# Patient Record
Sex: FEMALE | Race: WHITE | NOT HISPANIC OR LATINO
[De-identification: names, ages, dates, MRNs, and addresses within clinical notes are randomized per-mention and may not be internally consistent; named-entity substitution may affect disease eponyms.]

---

## 2017-03-01 ENCOUNTER — APPOINTMENT (OUTPATIENT)
Dept: THORACIC SURGERY | Facility: CLINIC | Age: 72
End: 2017-03-01

## 2017-03-01 VITALS
DIASTOLIC BLOOD PRESSURE: 80 MMHG | HEIGHT: 60 IN | BODY MASS INDEX: 26.11 KG/M2 | SYSTOLIC BLOOD PRESSURE: 150 MMHG | OXYGEN SATURATION: 98 % | HEART RATE: 91 BPM | WEIGHT: 133 LBS

## 2017-03-01 DIAGNOSIS — Z87.891 PERSONAL HISTORY OF NICOTINE DEPENDENCE: ICD-10-CM

## 2017-03-01 DIAGNOSIS — Z82.49 FAMILY HISTORY OF ISCHEMIC HEART DISEASE AND OTHER DISEASES OF THE CIRCULATORY SYSTEM: ICD-10-CM

## 2017-03-01 DIAGNOSIS — Z78.9 OTHER SPECIFIED HEALTH STATUS: ICD-10-CM

## 2017-07-26 ENCOUNTER — APPOINTMENT (OUTPATIENT)
Dept: THORACIC SURGERY | Facility: CLINIC | Age: 72
End: 2017-07-26

## 2017-07-26 VITALS
RESPIRATION RATE: 16 BRPM | HEART RATE: 95 BPM | DIASTOLIC BLOOD PRESSURE: 68 MMHG | SYSTOLIC BLOOD PRESSURE: 130 MMHG | HEIGHT: 60 IN | OXYGEN SATURATION: 95 %

## 2017-09-08 ENCOUNTER — FORM ENCOUNTER (OUTPATIENT)
Age: 72
End: 2017-09-08

## 2017-09-09 ENCOUNTER — APPOINTMENT (OUTPATIENT)
Dept: NUCLEAR MEDICINE | Facility: CLINIC | Age: 72
End: 2017-09-09
Payer: MEDICARE

## 2017-09-09 ENCOUNTER — OUTPATIENT (OUTPATIENT)
Dept: OUTPATIENT SERVICES | Facility: HOSPITAL | Age: 72
LOS: 1 days | End: 2017-09-09
Payer: MEDICARE

## 2017-09-09 DIAGNOSIS — R91.8 OTHER NONSPECIFIC ABNORMAL FINDING OF LUNG FIELD: ICD-10-CM

## 2017-09-09 PROCEDURE — 78815 PET IMAGE W/CT SKULL-THIGH: CPT | Mod: 26,PI

## 2017-09-09 PROCEDURE — A9552: CPT

## 2017-09-09 PROCEDURE — 78815 PET IMAGE W/CT SKULL-THIGH: CPT

## 2017-10-05 ENCOUNTER — APPOINTMENT (OUTPATIENT)
Dept: THORACIC SURGERY | Facility: CLINIC | Age: 72
End: 2017-10-05
Payer: MEDICARE

## 2017-10-05 ENCOUNTER — APPOINTMENT (OUTPATIENT)
Dept: THORACIC SURGERY | Facility: CLINIC | Age: 72
End: 2017-10-05

## 2017-10-05 VITALS
OXYGEN SATURATION: 96 % | HEART RATE: 82 BPM | WEIGHT: 135 LBS | TEMPERATURE: 97.8 F | RESPIRATION RATE: 15 BRPM | HEIGHT: 60 IN | SYSTOLIC BLOOD PRESSURE: 124 MMHG | BODY MASS INDEX: 26.5 KG/M2 | DIASTOLIC BLOOD PRESSURE: 75 MMHG

## 2017-10-05 DIAGNOSIS — Z01.810 ENCOUNTER FOR PREPROCEDURAL CARDIOVASCULAR EXAMINATION: ICD-10-CM

## 2017-10-05 PROCEDURE — 99215 OFFICE O/P EST HI 40 MIN: CPT

## 2017-10-05 RX ORDER — ESCITALOPRAM OXALATE 10 MG/1
10 TABLET ORAL
Qty: 90 | Refills: 0 | Status: DISCONTINUED | COMMUNITY
Start: 2016-11-02

## 2017-10-05 RX ORDER — RALOXIFENE HYDROCHLORIDE 60 MG/1
60 TABLET, FILM COATED ORAL
Qty: 90 | Refills: 0 | Status: DISCONTINUED | COMMUNITY
Start: 2017-06-12

## 2017-10-05 RX ORDER — CIPROFLOXACIN HYDROCHLORIDE 250 MG/1
250 TABLET, FILM COATED ORAL
Qty: 6 | Refills: 0 | Status: DISCONTINUED | COMMUNITY
Start: 2017-04-19

## 2017-12-11 ENCOUNTER — APPOINTMENT (OUTPATIENT)
Dept: PULMONOLOGY | Facility: CLINIC | Age: 72
End: 2017-12-11
Payer: MEDICARE

## 2017-12-11 PROCEDURE — 94060 EVALUATION OF WHEEZING: CPT

## 2017-12-11 PROCEDURE — 94726 PLETHYSMOGRAPHY LUNG VOLUMES: CPT

## 2017-12-11 PROCEDURE — 94729 DIFFUSING CAPACITY: CPT

## 2017-12-20 ENCOUNTER — APPOINTMENT (OUTPATIENT)
Dept: THORACIC SURGERY | Facility: CLINIC | Age: 72
End: 2017-12-20
Payer: MEDICARE

## 2017-12-20 VITALS
OXYGEN SATURATION: 97 % | DIASTOLIC BLOOD PRESSURE: 60 MMHG | HEIGHT: 60 IN | HEART RATE: 79 BPM | SYSTOLIC BLOOD PRESSURE: 128 MMHG

## 2017-12-20 PROCEDURE — 99214 OFFICE O/P EST MOD 30 MIN: CPT

## 2018-01-24 ENCOUNTER — FORM ENCOUNTER (OUTPATIENT)
Age: 73
End: 2018-01-24

## 2018-01-25 ENCOUNTER — OUTPATIENT (OUTPATIENT)
Dept: OUTPATIENT SERVICES | Facility: HOSPITAL | Age: 73
LOS: 1 days | End: 2018-01-25
Payer: MEDICARE

## 2018-01-25 ENCOUNTER — APPOINTMENT (OUTPATIENT)
Dept: CT IMAGING | Facility: CLINIC | Age: 73
End: 2018-01-25

## 2018-01-25 DIAGNOSIS — Z00.8 ENCOUNTER FOR OTHER GENERAL EXAMINATION: ICD-10-CM

## 2018-01-25 PROCEDURE — 71250 CT THORAX DX C-: CPT | Mod: 26

## 2018-01-25 PROCEDURE — 71250 CT THORAX DX C-: CPT

## 2018-01-31 ENCOUNTER — APPOINTMENT (OUTPATIENT)
Dept: THORACIC SURGERY | Facility: CLINIC | Age: 73
End: 2018-01-31
Payer: MEDICARE

## 2018-01-31 VITALS
BODY MASS INDEX: 25.98 KG/M2 | WEIGHT: 133 LBS | OXYGEN SATURATION: 97 % | DIASTOLIC BLOOD PRESSURE: 62 MMHG | SYSTOLIC BLOOD PRESSURE: 122 MMHG | HEART RATE: 74 BPM

## 2018-01-31 DIAGNOSIS — R91.8 OTHER NONSPECIFIC ABNORMAL FINDING OF LUNG FIELD: ICD-10-CM

## 2018-01-31 PROCEDURE — 99213 OFFICE O/P EST LOW 20 MIN: CPT

## 2018-07-18 ENCOUNTER — FORM ENCOUNTER (OUTPATIENT)
Age: 73
End: 2018-07-18

## 2018-07-19 ENCOUNTER — APPOINTMENT (OUTPATIENT)
Dept: CT IMAGING | Facility: CLINIC | Age: 73
End: 2018-07-19
Payer: MEDICARE

## 2018-07-19 ENCOUNTER — OUTPATIENT (OUTPATIENT)
Dept: OUTPATIENT SERVICES | Facility: HOSPITAL | Age: 73
LOS: 1 days | End: 2018-07-19
Payer: MEDICARE

## 2018-07-19 DIAGNOSIS — Z00.8 ENCOUNTER FOR OTHER GENERAL EXAMINATION: ICD-10-CM

## 2018-07-19 PROCEDURE — 71250 CT THORAX DX C-: CPT

## 2018-07-19 PROCEDURE — 71250 CT THORAX DX C-: CPT | Mod: 26

## 2018-07-25 ENCOUNTER — APPOINTMENT (OUTPATIENT)
Dept: THORACIC SURGERY | Facility: CLINIC | Age: 73
End: 2018-07-25
Payer: MEDICARE

## 2018-07-25 VITALS
BODY MASS INDEX: 26.11 KG/M2 | HEART RATE: 78 BPM | HEIGHT: 60 IN | RESPIRATION RATE: 16 BRPM | WEIGHT: 133 LBS | OXYGEN SATURATION: 95 % | TEMPERATURE: 98.2 F | DIASTOLIC BLOOD PRESSURE: 75 MMHG | SYSTOLIC BLOOD PRESSURE: 121 MMHG

## 2018-07-25 DIAGNOSIS — Z82.49 FAMILY HISTORY OF ISCHEMIC HEART DISEASE AND OTHER DISEASES OF THE CIRCULATORY SYSTEM: ICD-10-CM

## 2018-07-25 PROCEDURE — 99213 OFFICE O/P EST LOW 20 MIN: CPT

## 2018-12-06 ENCOUNTER — RECORD ABSTRACTING (OUTPATIENT)
Age: 73
End: 2018-12-06

## 2018-12-06 DIAGNOSIS — H81.399 OTHER PERIPHERAL VERTIGO, UNSPECIFIED EAR: ICD-10-CM

## 2018-12-06 DIAGNOSIS — I25.10 ATHEROSCLEROTIC HEART DISEASE OF NATIVE CORONARY ARTERY W/OUT ANGINA PECTORIS: ICD-10-CM

## 2018-12-06 DIAGNOSIS — Z87.74 PERSONAL HISTORY OF (CORRECTED) CONGENITAL MALFORMATIONS OF HEART AND CIRCULATORY SYSTEM: ICD-10-CM

## 2018-12-06 DIAGNOSIS — S32.000A WEDGE COMPRESSION FRACTURE OF UNSPECIFIED LUMBAR VERTEBRA, INITIAL ENCOUNTER FOR CLOSED FRACTURE: ICD-10-CM

## 2018-12-06 DIAGNOSIS — Z87.898 PERSONAL HISTORY OF OTHER SPECIFIED CONDITIONS: ICD-10-CM

## 2018-12-06 DIAGNOSIS — Z86.59 PERSONAL HISTORY OF OTHER MENTAL AND BEHAVIORAL DISORDERS: ICD-10-CM

## 2018-12-14 ENCOUNTER — APPOINTMENT (OUTPATIENT)
Dept: INTERNAL MEDICINE | Facility: CLINIC | Age: 73
End: 2018-12-14
Payer: MEDICARE

## 2018-12-14 ENCOUNTER — NON-APPOINTMENT (OUTPATIENT)
Age: 73
End: 2018-12-14

## 2018-12-14 VITALS
WEIGHT: 131 LBS | DIASTOLIC BLOOD PRESSURE: 79 MMHG | SYSTOLIC BLOOD PRESSURE: 136 MMHG | HEIGHT: 60 IN | HEART RATE: 85 BPM | BODY MASS INDEX: 25.72 KG/M2

## 2018-12-14 DIAGNOSIS — R73.03 PREDIABETES.: ICD-10-CM

## 2018-12-14 LAB
BILIRUB UR QL STRIP: NORMAL
CLARITY UR: CLEAR
COLLECTION METHOD: NORMAL
GLUCOSE UR-MCNC: NORMAL
HCG UR QL: 0.2 EU/DL
HGB UR QL STRIP.AUTO: NORMAL
KETONES UR-MCNC: NORMAL
LEUKOCYTE ESTERASE UR QL STRIP: NORMAL
NITRITE UR QL STRIP: NORMAL
PH UR STRIP: 6
PROT UR STRIP-MCNC: NORMAL
SP GR UR STRIP: 1.02

## 2018-12-14 PROCEDURE — 81003 URINALYSIS AUTO W/O SCOPE: CPT | Mod: QW

## 2018-12-14 PROCEDURE — 36415 COLL VENOUS BLD VENIPUNCTURE: CPT

## 2018-12-14 PROCEDURE — G0008: CPT

## 2018-12-14 PROCEDURE — 93000 ELECTROCARDIOGRAM COMPLETE: CPT

## 2018-12-14 PROCEDURE — G0439: CPT

## 2018-12-14 PROCEDURE — 90662 IIV NO PRSV INCREASED AG IM: CPT

## 2018-12-14 RX ORDER — NAPROXEN SODIUM 220 MG
TABLET ORAL
Refills: 0 | Status: DISCONTINUED | COMMUNITY
End: 2018-12-14

## 2018-12-14 NOTE — HISTORY OF PRESENT ILLNESS
[FreeTextEntry1] : for cpe  has joint pains and some stable ramos with stairs  her chest ct is stable and plan by dr godinez is a f/u ct in july 2019

## 2018-12-14 NOTE — COUNSELING
[Healthy eating counseling provided] : healthy eating [Activity counseling provided] : activity [None] : None [de-identified] : patient counseled on    diet   importance of exercise and not smoking  regular dental care and periodic eye exams.  timing of when will be due for colonoscopy   discussed  regular  mammography and  regular gyn visits discussed\par

## 2018-12-14 NOTE — PHYSICAL EXAM
[Normal] : normal gait, coordination grossly intact, no focal deficits [de-identified] : refueses breast  exam  she says she will see gyn

## 2018-12-14 NOTE — COUNSELING
[Healthy eating counseling provided] : healthy eating [Activity counseling provided] : activity [None] : None [de-identified] : patient counseled on    diet   importance of exercise and not smoking  regular dental care and periodic eye exams.  timing of when will be due for colonoscopy   discussed  regular  mammography and  regular gyn visits discussed\par

## 2018-12-14 NOTE — PHYSICAL EXAM
[Normal] : normal gait, coordination grossly intact, no focal deficits [de-identified] : refueses breast  exam  she says she will see gyn

## 2018-12-16 LAB
ALBUMIN SERPL ELPH-MCNC: 4.6 G/DL
ALP BLD-CCNC: 65 U/L
ALT SERPL-CCNC: 11 U/L
ANION GAP SERPL CALC-SCNC: 11 MMOL/L
AST SERPL-CCNC: 17 U/L
BASOPHILS # BLD AUTO: 0.02 K/UL
BASOPHILS NFR BLD AUTO: 0.4 %
BILIRUB SERPL-MCNC: 0.4 MG/DL
BUN SERPL-MCNC: 14 MG/DL
CALCIUM SERPL-MCNC: 9.2 MG/DL
CHLORIDE SERPL-SCNC: 101 MMOL/L
CHOLEST SERPL-MCNC: 183 MG/DL
CHOLEST/HDLC SERPL: 3.9 RATIO
CO2 SERPL-SCNC: 26 MMOL/L
CREAT SERPL-MCNC: 0.78 MG/DL
EOSINOPHIL # BLD AUTO: 0.13 K/UL
EOSINOPHIL NFR BLD AUTO: 2.4 %
GLUCOSE SERPL-MCNC: 77 MG/DL
HBA1C MFR BLD HPLC: 5.5 %
HCT VFR BLD CALC: 38.1 %
HDLC SERPL-MCNC: 47 MG/DL
HGB BLD-MCNC: 12.8 G/DL
IMM GRANULOCYTES NFR BLD AUTO: 0.2 %
LDLC SERPL CALC-MCNC: 114 MG/DL
LYMPHOCYTES # BLD AUTO: 2.15 K/UL
LYMPHOCYTES NFR BLD AUTO: 40 %
MAN DIFF?: NORMAL
MCHC RBC-ENTMCNC: 30 PG
MCHC RBC-ENTMCNC: 33.6 GM/DL
MCV RBC AUTO: 89.2 FL
MONOCYTES # BLD AUTO: 0.35 K/UL
MONOCYTES NFR BLD AUTO: 6.5 %
NEUTROPHILS # BLD AUTO: 2.72 K/UL
NEUTROPHILS NFR BLD AUTO: 50.5 %
PLATELET # BLD AUTO: 207 K/UL
POTASSIUM SERPL-SCNC: 4.7 MMOL/L
PROT SERPL-MCNC: 6.7 G/DL
RBC # BLD: 4.27 M/UL
RBC # FLD: 13.1 %
SODIUM SERPL-SCNC: 138 MMOL/L
TRIGL SERPL-MCNC: 110 MG/DL
WBC # FLD AUTO: 5.38 K/UL

## 2019-04-12 ENCOUNTER — FORM ENCOUNTER (OUTPATIENT)
Age: 74
End: 2019-04-12

## 2019-04-13 ENCOUNTER — OUTPATIENT (OUTPATIENT)
Dept: OUTPATIENT SERVICES | Facility: HOSPITAL | Age: 74
LOS: 1 days | End: 2019-04-13
Payer: MEDICARE

## 2019-04-13 ENCOUNTER — APPOINTMENT (OUTPATIENT)
Dept: CT IMAGING | Facility: CLINIC | Age: 74
End: 2019-04-13
Payer: MEDICARE

## 2019-04-13 DIAGNOSIS — R91.8 OTHER NONSPECIFIC ABNORMAL FINDING OF LUNG FIELD: ICD-10-CM

## 2019-04-13 PROCEDURE — 71250 CT THORAX DX C-: CPT | Mod: 26

## 2019-04-13 PROCEDURE — 71250 CT THORAX DX C-: CPT

## 2019-05-08 ENCOUNTER — APPOINTMENT (OUTPATIENT)
Dept: THORACIC SURGERY | Facility: CLINIC | Age: 74
End: 2019-05-08
Payer: MEDICARE

## 2019-05-08 VITALS
DIASTOLIC BLOOD PRESSURE: 80 MMHG | OXYGEN SATURATION: 97 % | RESPIRATION RATE: 16 BRPM | HEIGHT: 60 IN | TEMPERATURE: 97.8 F | BODY MASS INDEX: 26.31 KG/M2 | SYSTOLIC BLOOD PRESSURE: 139 MMHG | WEIGHT: 134 LBS | HEART RATE: 83 BPM

## 2019-05-08 PROCEDURE — 99213 OFFICE O/P EST LOW 20 MIN: CPT

## 2019-05-08 NOTE — PHYSICAL EXAM
[Extraocular Movements] : extraocular movements were intact [Neck Appearance] : the appearance of the neck was normal [Sclera] : the sclera and conjunctiva were normal [Neck Cervical Mass (___cm)] : no neck mass was observed [Jugular Venous Distention Increased] : there was no jugular-venous distention [Respiration, Rhythm And Depth] : normal respiratory rhythm and effort [] : no respiratory distress [Auscultation Breath Sounds / Voice Sounds] : lungs were clear to auscultation bilaterally [Exaggerated Use Of Accessory Muscles For Inspiration] : no accessory muscle use [Heart Rate And Rhythm] : heart rate was normal and rhythm regular [Diminished Respiratory Excursion] : normal chest expansion [Bowel Sounds] : normal bowel sounds [Abdomen Tenderness] : non-tender [Abdomen Soft] : soft [Cervical Lymph Nodes Enlarged Posterior Bilaterally] : posterior cervical [Cervical Lymph Nodes Enlarged Anterior Bilaterally] : anterior cervical [Supraclavicular Lymph Nodes Enlarged Bilaterally] : supraclavicular [No CVA Tenderness] : no ~M costovertebral angle tenderness [Abnormal Walk] : normal gait [Oriented To Time, Place, And Person] : oriented to person, place, and time [No Focal Deficits] : no focal deficits [Skin Color & Pigmentation] : normal skin color and pigmentation [Affect] : the affect was normal [Impaired Insight] : insight and judgment were intact [Mood] : the mood was normal

## 2019-05-10 NOTE — CONSULT LETTER
[Dear  ___] : Dear  [unfilled], [Courtesy Letter:] : I had the pleasure of seeing your patient, [unfilled], in my office today. [Please see my note below.] : Please see my note below. [Sincerely,] : Sincerely, [FreeTextEntry2] : Dr. Sheikh [FreeTextEntry3] : \par \par \par Adriano Last MD, FACS \par , Division of Thoracic Surgery \par [facility] \par Chief, Thoracic Surgery \par [facility] \par Department of Cardiovascular & Thoracic Surgery \par  \par [employer] [facility] [employer] [facility]

## 2019-05-10 NOTE — HISTORY OF PRESENT ILLNESS
[FreeTextEntry1] : 75 y/o female with history of lung nodules returns today for follow up. She was first noted to have a right lung nodule on a lung screening CT scan done in 2014. \par \par  Chest CT Scan on 4/13/19 revealed the 1.2 x 0.9 cm (series 2 image 78) groundglass and solid opacity in the right middle lobe is unchanged since 1/25/2018.  The mosaic attenuation of the lungs is unchanged. The tree-in-bud nodules that shown in the bilateral upper lobes is unchanged. The biapical opacities \par are unchanged. No pleural effusion or pneumothorax.  No enlarged chest lymph nodes. \par \par CT chest scan done 1/25/18 again reports a reportedly unchanged 11 mm part solid nodule in the RML. CT Chest scan on 7/19/18 reveals once again, a 1.1 cm part solid nodule is noted within the right middle lobe. It is unchanged in its size and appearance when compared to previous exam. \par \par The patient presents today with complaints of right sided scapula pain worsened with movement and activity. Minimal relief from Tylenol or Advil. She has Ortho appt in two weeks. Stable shortness of breath with exertion. She reports sneezing and itchy throat d/t allergies. Started taking allergy medication. The patient denies fever, chills, dysphagia or hemoptysis. \par

## 2019-05-10 NOTE — ASSESSMENT
[FreeTextEntry1] : 75 y/o female with history of lung nodules returns today for follow up.  She was first noted to have a right lung nodule on a lung screening CT scan done in 2014. \par \par Chest CT Scan on 4/13/19 revealed the 1.2 x 0.9 cm groundglass and solid opacity in the right middle lobe is unchanged since 1/25/2018. The mosaic attenuation of the lungs is unchanged. The tree-in-bud nodules that shown in the bilateral upper lobes is unchanged. The biapical opacities are unchanged. \par \par I have reviewed the images during the time of this office visit and I made the following recommendation. The nodule appears unchanged dating back to 2017. I have recommended that the patient follow up in one year with a CT scan of the chest.\par \par Written by Molly Hogan NP, acting as a scribe for Adriano Boswell MD.\par The documentation recorded by the scribe accurately reflects the service I personally performed and the decisions made by me. ADRIANO BOSWELL MD\par

## 2019-07-18 ENCOUNTER — APPOINTMENT (OUTPATIENT)
Dept: GASTROENTEROLOGY | Facility: CLINIC | Age: 74
End: 2019-07-18
Payer: MEDICARE

## 2019-07-18 VITALS
BODY MASS INDEX: 25.91 KG/M2 | WEIGHT: 132 LBS | OXYGEN SATURATION: 97 % | SYSTOLIC BLOOD PRESSURE: 137 MMHG | HEIGHT: 60 IN | HEART RATE: 80 BPM | DIASTOLIC BLOOD PRESSURE: 80 MMHG

## 2019-07-18 DIAGNOSIS — R10.13 EPIGASTRIC PAIN: ICD-10-CM

## 2019-07-18 PROCEDURE — 99204 OFFICE O/P NEW MOD 45 MIN: CPT

## 2019-07-18 NOTE — HISTORY OF PRESENT ILLNESS
[de-identified] : Patient is a negative stress with her care her  who suffers from Alzheimer's and is deteriorating. She is eating a moderate diet but regained some weight. She has heartburn and bloating abdominal discomfort. She denies odynophagia dysphagia or satiety. She denies rectal bleeding. She is overdue for colonoscopy given that her last one was over a decade ago. She denies anemia or abnormal liver enzymes but her liver profile is off.

## 2019-07-18 NOTE — REVIEW OF SYSTEMS
[Feeling Poorly] : feeling poorly [As Noted in HPI] : as noted in HPI [Depression] : depression [Negative] : Heme/Lymph

## 2019-07-18 NOTE — ASSESSMENT
[FreeTextEntry1] : My impression is that of a female who is draining through her 's Alzheimer's progression who seems to be having functional upper GI symptoms no mucosal injury is in the differential diagnosis. She also is overdue for a screening colonoscopy.\par \par I have spent a great deal of time discussing the role of daily exercise with the patient. We discussed lifestyle modification and the merits of brief, exertional efforts. I have discussed nutrition in great detail including consuming vegetable fibers on a daily basis.  We have also reviewed the benefits of soluble fiber supplementation, including (but not limited to), favorable effects on lipid profile, weight control, decreasing the risk of cardiovascular disease and the salutary effects on colonic microbiota.\par \par I have asked the patient to schedule both an upper endoscopy and a colonoscopy in the near future. I have reviewed the risks benefits and alternatives and provide the patient literature to read.  I have emphasized the need to have a good clean out including adequate fluid intake and avoiding seeds for one week prior to the procedure.\par \par Trial of ranitidine twice daily.

## 2019-11-15 ENCOUNTER — APPOINTMENT (OUTPATIENT)
Dept: GASTROENTEROLOGY | Facility: CLINIC | Age: 74
End: 2019-11-15
Payer: MEDICARE

## 2019-11-15 VITALS
HEIGHT: 59.5 IN | BODY MASS INDEX: 26.46 KG/M2 | SYSTOLIC BLOOD PRESSURE: 144 MMHG | DIASTOLIC BLOOD PRESSURE: 72 MMHG | RESPIRATION RATE: 16 BRPM | HEART RATE: 90 BPM | TEMPERATURE: 97.9 F | OXYGEN SATURATION: 96 % | WEIGHT: 133 LBS

## 2019-11-15 DIAGNOSIS — Z00.00 ENCOUNTER FOR GENERAL ADULT MEDICAL EXAMINATION W/OUT ABNORMAL FINDINGS: ICD-10-CM

## 2019-11-15 DIAGNOSIS — Z12.11 ENCOUNTER FOR SCREENING FOR MALIGNANT NEOPLASM OF COLON: ICD-10-CM

## 2019-11-15 DIAGNOSIS — Z12.12 ENCOUNTER FOR SCREENING FOR MALIGNANT NEOPLASM OF COLON: ICD-10-CM

## 2019-11-15 PROCEDURE — 99204 OFFICE O/P NEW MOD 45 MIN: CPT

## 2019-11-15 NOTE — PHYSICAL EXAM
[General Appearance - In No Acute Distress] : in no acute distress [General Appearance - Alert] : alert [Auscultation Breath Sounds / Voice Sounds] : lungs were clear to auscultation bilaterally [Heart Rate And Rhythm] : heart rate was normal and rhythm regular [Heart Sounds Gallop] : no gallops [Heart Sounds] : normal S1 and S2 [Murmurs] : no murmurs [Heart Sounds Pericardial Friction Rub] : no pericardial rub [Bowel Sounds] : normal bowel sounds [Abdomen Soft] : soft [Abdomen Tenderness] : non-tender [Abdomen Mass (___ Cm)] : no abdominal mass palpated [] : no hepato-splenomegaly [Abnormal Walk] : normal gait [Motor Tone] : muscle strength and tone were normal [Nail Clubbing] : no clubbing  or cyanosis of the fingernails [Musculoskeletal - Swelling] : no joint swelling seen

## 2019-11-15 NOTE — ASSESSMENT
[FreeTextEntry1] : \par GERD - reviewed lifestyle modification\par to keep HOB elevated\par avoid triggers\par don't eat close to bedtime\par can schedule EGD to evaluate anatomy\par \par screening colonoscopy - reviewed RBA\par to schedule

## 2019-11-15 NOTE — HISTORY OF PRESENT ILLNESS
[FreeTextEntry1] : \par 74 year old woman - being followed for lung nodule\par \par Started taking Pepcid - which helps; daily\par Aleve prn\par \par For the last 6-8 months\par \par feels like her stomach is into her chest\par has GERD and regurgitation - into her mouth\par at night - sleeping is uncomfortable - feels a heavinees from stomach\par belching and bloating\par Has gained some weight, about 20 lb - after quitting smoking (4-5 years ago)\par In the past year - may be losing muscle mass and abdomen looks expanded; weight is the same\par \par If hold her cat to her chest - feels uncomfortable breathing\par \par No cardiac issues - no CP\par \par  has Alzheimers - difficult to take care of herself\par \par Last colonoscopy 12 years ago\par BMs are urgent - since vertebral fracture and kyphoplasty\par Has formed stool\par

## 2019-12-17 ENCOUNTER — APPOINTMENT (OUTPATIENT)
Dept: INTERNAL MEDICINE | Facility: CLINIC | Age: 74
End: 2019-12-17

## 2019-12-30 ENCOUNTER — APPOINTMENT (OUTPATIENT)
Dept: FAMILY MEDICINE | Facility: CLINIC | Age: 74
End: 2019-12-30
Payer: MEDICARE

## 2019-12-30 VITALS
HEART RATE: 71 BPM | BODY MASS INDEX: 26.85 KG/M2 | WEIGHT: 135 LBS | RESPIRATION RATE: 12 BRPM | SYSTOLIC BLOOD PRESSURE: 121 MMHG | OXYGEN SATURATION: 99 % | DIASTOLIC BLOOD PRESSURE: 62 MMHG | HEIGHT: 59.5 IN

## 2019-12-30 DIAGNOSIS — Z23 ENCOUNTER FOR IMMUNIZATION: ICD-10-CM

## 2019-12-30 DIAGNOSIS — K21.9 GASTRO-ESOPHAGEAL REFLUX DISEASE W/OUT ESOPHAGITIS: ICD-10-CM

## 2019-12-30 PROCEDURE — G0008: CPT

## 2019-12-30 PROCEDURE — 90686 IIV4 VACC NO PRSV 0.5 ML IM: CPT

## 2019-12-30 PROCEDURE — 99204 OFFICE O/P NEW MOD 45 MIN: CPT | Mod: 25

## 2019-12-30 NOTE — REVIEW OF SYSTEMS
[Abdominal Pain] : no abdominal pain [Diarrhea] : diarrhea [Constipation] : no constipation [Nausea] : nausea [Vomiting] : no vomiting [Heartburn] : heartburn [Melena] : no melena [Joint Pain] : joint pain [Negative] : Neurological

## 2019-12-30 NOTE — PHYSICAL EXAM
[No Acute Distress] : no acute distress [Well Nourished] : well nourished [Well Developed] : well developed [Normal Sclera/Conjunctiva] : normal sclera/conjunctiva [PERRL] : pupils equal round and reactive to light [Normal Outer Ear/Nose] : the outer ears and nose were normal in appearance [Normal Oropharynx] : the oropharynx was normal [Normal TMs] : both tympanic membranes were normal [No JVD] : no jugular venous distention [No Lymphadenopathy] : no lymphadenopathy [Supple] : supple [Thyroid Normal, No Nodules] : the thyroid was normal and there were no nodules present [No Respiratory Distress] : no respiratory distress  [No Accessory Muscle Use] : no accessory muscle use [Normal Percussion] : the chest was normal to percussion [Normal Rate] : normal rate  [Regular Rhythm] : with a regular rhythm [No Murmur] : no murmur heard [No Edema] : there was no peripheral edema [Soft] : abdomen soft [Non Tender] : non-tender [No Masses] : no abdominal mass palpated [No HSM] : no HSM [Normal Bowel Sounds] : normal bowel sounds [Normal Posterior Cervical Nodes] : no posterior cervical lymphadenopathy [Normal Anterior Cervical Nodes] : no anterior cervical lymphadenopathy [Normal] : normal gait, coordination grossly intact, no focal deficits and deep tendon reflexes were 2+ and symmetric

## 2019-12-30 NOTE — HISTORY OF PRESENT ILLNESS
[FreeTextEntry8] : A. she's here to establish care for a variety of issues. These would include osteoporosis with spinal fracture and kyphoplasty chronic GERD, and pulmonary nodules she follows with a gastroenterologist and a pulmonologist for the GERD and the nodules and isn't be scheduled for upper and lower endoscopies in the near future otherwise she has been quite healthy she takes no medications other than Pepcid she does care for her  who is severely disabled review of systems is unremarkable with exception of some mild lightheadedness

## 2019-12-30 NOTE — ASSESSMENT
[FreeTextEntry1] : Patient seems to be doing quite well she will be following up with the pulmonary and GI as noted above we will arrange for home total laboratory work she needs a mammogram is up and unable to do this because of constraints of caring for her . She is a one to be up-to-date with colonoscopy she is up-to-date on immunizations a flu shot is given today

## 2020-01-09 ENCOUNTER — LABORATORY RESULT (OUTPATIENT)
Age: 75
End: 2020-01-09

## 2020-02-14 ENCOUNTER — EMERGENCY (EMERGENCY)
Facility: HOSPITAL | Age: 75
LOS: 1 days | Discharge: HOME | End: 2020-02-14
Attending: EMERGENCY MEDICINE
Payer: MEDICARE

## 2020-02-14 VITALS
OXYGEN SATURATION: 97 % | RESPIRATION RATE: 18 BRPM | HEART RATE: 84 BPM | DIASTOLIC BLOOD PRESSURE: 81 MMHG | SYSTOLIC BLOOD PRESSURE: 171 MMHG | TEMPERATURE: 98 F

## 2020-02-14 DIAGNOSIS — Z90.722 ACQUIRED ABSENCE OF OVARIES, BILATERAL: Chronic | ICD-10-CM

## 2020-02-14 DIAGNOSIS — Z98.890 OTHER SPECIFIED POSTPROCEDURAL STATES: Chronic | ICD-10-CM

## 2020-02-14 DIAGNOSIS — Z90.49 ACQUIRED ABSENCE OF OTHER SPECIFIED PARTS OF DIGESTIVE TRACT: Chronic | ICD-10-CM

## 2020-02-14 LAB
ALBUMIN SERPL ELPH-MCNC: 4.7 G/DL — SIGNIFICANT CHANGE UP (ref 3.3–5)
ALP SERPL-CCNC: 63 U/L — SIGNIFICANT CHANGE UP (ref 40–120)
ALT FLD-CCNC: 14 U/L — SIGNIFICANT CHANGE UP (ref 10–45)
ANION GAP SERPL CALC-SCNC: 13 MMOL/L — SIGNIFICANT CHANGE UP (ref 5–17)
APTT BLD: 29 SEC — SIGNIFICANT CHANGE UP (ref 27.5–36.3)
AST SERPL-CCNC: 11 U/L — SIGNIFICANT CHANGE UP (ref 10–40)
BASOPHILS # BLD AUTO: 0.06 K/UL — SIGNIFICANT CHANGE UP (ref 0–0.2)
BASOPHILS NFR BLD AUTO: 1.1 % — SIGNIFICANT CHANGE UP (ref 0–2)
BILIRUB SERPL-MCNC: 0.3 MG/DL — SIGNIFICANT CHANGE UP (ref 0.2–1.2)
BUN SERPL-MCNC: 10 MG/DL — SIGNIFICANT CHANGE UP (ref 7–23)
CALCIUM SERPL-MCNC: 9.6 MG/DL — SIGNIFICANT CHANGE UP (ref 8.4–10.5)
CHLORIDE SERPL-SCNC: 103 MMOL/L — SIGNIFICANT CHANGE UP (ref 96–108)
CHOLEST SERPL-MCNC: 192 MG/DL — SIGNIFICANT CHANGE UP (ref 10–199)
CO2 SERPL-SCNC: 25 MMOL/L — SIGNIFICANT CHANGE UP (ref 22–31)
CREAT SERPL-MCNC: 0.7 MG/DL — SIGNIFICANT CHANGE UP (ref 0.5–1.3)
EOSINOPHIL # BLD AUTO: 0.08 K/UL — SIGNIFICANT CHANGE UP (ref 0–0.5)
EOSINOPHIL NFR BLD AUTO: 1.5 % — SIGNIFICANT CHANGE UP (ref 0–6)
ERYTHROCYTE [SEDIMENTATION RATE] IN BLOOD: 12 MM/HR — SIGNIFICANT CHANGE UP (ref 0–20)
GLUCOSE SERPL-MCNC: 94 MG/DL — SIGNIFICANT CHANGE UP (ref 70–99)
HCT VFR BLD CALC: 41.9 % — SIGNIFICANT CHANGE UP (ref 34.5–45)
HDLC SERPL-MCNC: 54 MG/DL — SIGNIFICANT CHANGE UP
HGB BLD-MCNC: 13.7 G/DL — SIGNIFICANT CHANGE UP (ref 11.5–15.5)
IMM GRANULOCYTES NFR BLD AUTO: 0.4 % — SIGNIFICANT CHANGE UP (ref 0–1.5)
INR BLD: 0.96 RATIO — SIGNIFICANT CHANGE UP (ref 0.88–1.16)
LIPID PNL WITH DIRECT LDL SERPL: 120 MG/DL — HIGH
LYMPHOCYTES # BLD AUTO: 2.04 K/UL — SIGNIFICANT CHANGE UP (ref 1–3.3)
LYMPHOCYTES # BLD AUTO: 38 % — SIGNIFICANT CHANGE UP (ref 13–44)
MCHC RBC-ENTMCNC: 30.3 PG — SIGNIFICANT CHANGE UP (ref 27–34)
MCHC RBC-ENTMCNC: 32.7 GM/DL — SIGNIFICANT CHANGE UP (ref 32–36)
MCV RBC AUTO: 92.7 FL — SIGNIFICANT CHANGE UP (ref 80–100)
MONOCYTES # BLD AUTO: 0.28 K/UL — SIGNIFICANT CHANGE UP (ref 0–0.9)
MONOCYTES NFR BLD AUTO: 5.2 % — SIGNIFICANT CHANGE UP (ref 2–14)
NEUTROPHILS # BLD AUTO: 2.89 K/UL — SIGNIFICANT CHANGE UP (ref 1.8–7.4)
NEUTROPHILS NFR BLD AUTO: 53.8 % — SIGNIFICANT CHANGE UP (ref 43–77)
NRBC # BLD: 0 /100 WBCS — SIGNIFICANT CHANGE UP (ref 0–0)
PLATELET # BLD AUTO: 195 K/UL — SIGNIFICANT CHANGE UP (ref 150–400)
POTASSIUM SERPL-MCNC: 4.2 MMOL/L — SIGNIFICANT CHANGE UP (ref 3.5–5.3)
POTASSIUM SERPL-SCNC: 4.2 MMOL/L — SIGNIFICANT CHANGE UP (ref 3.5–5.3)
PROT SERPL-MCNC: 7.8 G/DL — SIGNIFICANT CHANGE UP (ref 6–8.3)
PROTHROM AB SERPL-ACNC: 11 SEC — SIGNIFICANT CHANGE UP (ref 10–12.9)
RBC # BLD: 4.52 M/UL — SIGNIFICANT CHANGE UP (ref 3.8–5.2)
RBC # FLD: 12.6 % — SIGNIFICANT CHANGE UP (ref 10.3–14.5)
SODIUM SERPL-SCNC: 141 MMOL/L — SIGNIFICANT CHANGE UP (ref 135–145)
TOTAL CHOLESTEROL/HDL RATIO MEASUREMENT: 3.6 RATIO — SIGNIFICANT CHANGE UP (ref 3.3–7.1)
TRIGL SERPL-MCNC: 94 MG/DL — SIGNIFICANT CHANGE UP (ref 10–149)
WBC # BLD: 5.37 K/UL — SIGNIFICANT CHANGE UP (ref 3.8–10.5)
WBC # FLD AUTO: 5.37 K/UL — SIGNIFICANT CHANGE UP (ref 3.8–10.5)

## 2020-02-14 PROCEDURE — 99284 EMERGENCY DEPT VISIT MOD MDM: CPT

## 2020-02-14 PROCEDURE — 70450 CT HEAD/BRAIN W/O DYE: CPT | Mod: 26

## 2020-02-14 PROCEDURE — 99220: CPT

## 2020-02-14 PROCEDURE — 93010 ELECTROCARDIOGRAM REPORT: CPT

## 2020-02-14 RX ORDER — ASPIRIN/CALCIUM CARB/MAGNESIUM 324 MG
81 TABLET ORAL ONCE
Refills: 0 | Status: COMPLETED | OUTPATIENT
Start: 2020-02-14 | End: 2020-02-14

## 2020-02-14 RX ORDER — ATORVASTATIN CALCIUM 80 MG/1
80 TABLET, FILM COATED ORAL AT BEDTIME
Refills: 0 | Status: DISCONTINUED | OUTPATIENT
Start: 2020-02-14 | End: 2020-02-22

## 2020-02-14 RX ORDER — ASPIRIN/CALCIUM CARB/MAGNESIUM 324 MG
81 TABLET ORAL DAILY
Refills: 0 | Status: DISCONTINUED | OUTPATIENT
Start: 2020-02-14 | End: 2020-02-22

## 2020-02-14 RX ADMIN — Medication 81 MILLIGRAM(S): at 18:38

## 2020-02-14 RX ADMIN — ATORVASTATIN CALCIUM 80 MILLIGRAM(S): 80 TABLET, FILM COATED ORAL at 21:42

## 2020-02-14 NOTE — ED CDU PROVIDER INITIAL DAY NOTE - MEDICAL DECISION MAKING DETAILS
Maya Villar MD 74 F w/ PMH of compression fractures, sent in to the ER for eval for R eye vision loss that occurred 6 days ago and self resolved, pt w/ no headache, no cp no sob. Pt was previously seen by ophthalmology and referred to neuroophthalmologist and now recommended by neuro to come for MRI/MRA head and further stroke eval. Pt given asa in the ED, currently w/ no visual deficit, plan for continued neuro checks in the ED.

## 2020-02-14 NOTE — ED CDU PROVIDER INITIAL DAY NOTE - PMH
Arthritis    Compression fracture of vertebrae    GERD (gastroesophageal reflux disease)    Lung nodule

## 2020-02-14 NOTE — ED CDU PROVIDER INITIAL DAY NOTE - DETAILS
- frequent re-eval  - vitals q 4hrs  - tele  - neurochecks q 4hrs  - MRI/MRA head  - neuro following  - case discussed with attending Dr. Villar

## 2020-02-14 NOTE — ED ADULT NURSE NOTE - CHPI ED NUR SYMPTOMS NEG
no dizziness/no blurred vision/no confusion/no weakness/no numbness/no fever/no nausea/no change in level of consciousness/no vomiting

## 2020-02-14 NOTE — ED PROVIDER NOTE - CLINICAL SUMMARY MEDICAL DECISION MAKING FREE TEXT BOX
Attending MD Troncoso: 73 yo female with no PMH presents with acute vision loss 6 days ago, curtain, lasted about 6 seconds and then had full recovery.  Seen by neuro-ophthalmologist this morning and told to come to ED for stroke workup.  Ophtho told her eye was normal.   On exam pupils dilated due to drops.  Remainder of exam normal.   Plan: CT head and neuro consult.

## 2020-02-14 NOTE — ED CDU PROVIDER INITIAL DAY NOTE - OBJECTIVE STATEMENT
75y/o F with PMH compression fractures, stable lung nodule, GERD, arthritis, was sent in by neuro-ophthalmologist for acute episode of right eye vision loss 6 days ago, followed by vision change, but with normal eye exam in office today. Pt states that she had sudden loss of vision like a "curtain" over her right eye that lasted for a few seconds and then completely resolved. notes her vision was normal afterwards. She has been seeing flashing lights for several weeks prior to loss of vision as well as afterwards. She denies floaters. She has intermittent pain in right temporal region that radiates down to her jaw but does not have any pain currently. She was seen by an ophthalmologist yesterday and was referred to see a neuro-ophthalmologist today who referred her for evaluation in ED. Pt currently is coming from neuro-ophthalmologist's office where she had her eyes dilated and had a normal eye exam. denies any blurry vision. denies speech changes, weakness, numbness/tingling, current H/A. denies fever, chills, flu like symptoms, CP, SOB, problems walking, problems going to the bathroom.   In ED, labs unremarkable, CTH showed a hypodensity in R corona radiata 2/2 ischemia, age indeterminate. neuro c/s'd, recommended CDU for continued monitoring and MRI/MRA head.     PCP: Dr. Angelo Sabillon  Ophtho: Dr. Lance  Neuro-ophtho: Dr. Lee [phone]

## 2020-02-14 NOTE — ED CDU PROVIDER INITIAL DAY NOTE - FAMILY HISTORY
Mother  Still living? Unknown  Family history of coronary artery disease, Age at diagnosis: Age Unknown     Father  Still living? Unknown  Family history of valvular heart disease, Age at diagnosis: Age Unknown

## 2020-02-14 NOTE — ED PROVIDER NOTE - OBJECTIVE STATEMENT
73yo woman PMH compression fractures, stable lung nodule was sent in by neuro-ophthalmologist for acute episode of right eye vision loss 6 days ago with normal eye exam in office today. 73yo woman PMH compression fractures, stable lung nodule was sent in by neuro-ophthalmologist for acute episode of right eye vision loss 6 days ago with normal eye exam in office today. Pt states that she had sudden loss of vision like a "curtain" over her right eye that lasted for a few seconds and then had complete recovery of her vision afterwards. She has been seeing flashing lights for several weeks prior to loss of vision as well as afterwards. She denies floaters. She has intermittent pain in right temporal region that radiates down to her jaw but does not have any pain currently. She was seen by an ophthalmologist yesterday and was referred to see a neuro-ophthalmologist today who referred her for evaluation in ED. Pt currently is coming from neuro-ophthalmologist's office where she had her eyes dilated and had a normal eye exam.  PCP: Dr. Angelo Sabillon  Ophtho: Dr. Lance  Neuro-ophtho: Dr. Lee [phone]

## 2020-02-14 NOTE — ED PROVIDER NOTE - NS ED ROS FT
General: no fevers  Head: +intermittent right temporal pain, no current headache  Eyes: +resolved vision loss in right eye  ENT: no nasal discharge/congestion, no sore throat  CV: no chest pain  Resp: no SOB, no cough  GI: no N/V, no abdominal pain  : no dysuria  MSK: no joint pain  Skin: no new rash  Neuro: no focal weakness or change in sensation

## 2020-02-14 NOTE — CONSULT NOTE ADULT - ASSESSMENT
73 yo right handed  woman who presents to [facility] on referral by her neuro-ophthalmologist, Dr. Lee, for 6 day onset of transient painless "curtain-drop" right monocular vision loss lasting seconds w/ spontaneous resolution. ROS also revealed intermittent flashing lights (chronic,  years), reported isolated right temporal pain w/ radiation to the neck, but was transient and is not currently symptomatic. Patient was seen by opthalmology outpatient with normal eye exam. Other pertinent hx includes hx of left eye cataract surgery, HLD lifestyle controlled. CTH shows possible right corona radiata hypodensity. Neurology consulted for further evaluation. NIHSS 0 MRS 0.     Exam grossly unremarkable     Imaging: CTH shows possible right CR hypodense lesion     Impression: Transient right monocular painless vision loss possibly 2/2 TIA, r/o ESUS. CTH shows asymptomatic right CR hypodensity.  Although lower of the differential, GCA is not an uncommon phenomenon in this patient population     Plan:   Imaging:   -admit to CDU   -MRI brain w/o contrast  -MRA H w/o contrast, MRA neck w/ contrast   -TTE w/ bubble   Labs: []A1c []LDL   Meds: []ASA 81mg, []80 atorvastatin   Recommendations  -start ASA for now (patient 6 days out since onset of symptoms  -secondary stroke prevention w/ risk factor control

## 2020-02-14 NOTE — ED PROVIDER NOTE - ATTENDING CONTRIBUTION TO CARE
Attending MD Troncoso:  I personally have seen and examined this patient.  Resident note reviewed and agree on plan of care and except where noted.

## 2020-02-14 NOTE — ED PROVIDER NOTE - PROGRESS NOTE DETAILS
Maya Villar MD pt signed out to me pending neurology recommendations, s/p aspirin, pt w/ no complaints, spoke w/ neurology recommending MRI MRA and observation unit admission.

## 2020-02-14 NOTE — ED PROVIDER NOTE - PHYSICAL EXAMINATION
General: well-appearing elderly woman in no acute distress  Head: normocephalic, atraumatic, no tenderness to palpation of right temporal region  Eyes: bilaterally dilated eyes 2/2 ophtho exam this morning, EOMI  Mouth: moist mucous membranes, tongue midline  Neck: supple neck  CV: normal rate and rhythm, peripheral pulses 2+ bilaterally  Respiratory: clear to auscultation bilaterally  Abdomen: soft, nontender, nondistended  : no suprapubic tenderness, no CVAT  Neuro: alert and oriented x3, CN III-XII intact, speech clear, no pronator drift, no dysmetria, strength 5/5 UE and LE bilaterally  Skin: no rashes or lesions

## 2020-02-14 NOTE — ED ADULT NURSE NOTE - OBJECTIVE STATEMENT
74 yrs old female with h/o high cholesterol , present to the ER to obtain a Ct. Scan of the head to r/o TIA. As per pt she had sudden vision loss to her right eye on Saturday which resolved. Pt denies complain of discomfort /

## 2020-02-14 NOTE — CONSULT NOTE ADULT - SUBJECTIVE AND OBJECTIVE BOX
HPI: 75 yo.....woman who presents to [facility] on referral by her neuro-ophthalmologist, Dr. Lee, for 6 day onset of transient painless "curtain-drop" right monocular vision loss lasting seconds w/ spontaneous resolution. ROS also revealed intermittent flashing lights for the past several weeks, reported right temporal pain w/ radiation to the jaw. Patient was seen by opthalmology outpatient with normal eye exam. Other pertinent hx includes hx of left eye cataract surgery. CTH shows possible right corona radiata hypodensity. Neurology consulted for further evaluation.      (Stroke only)  NIHSS:   MRS:   ICH:     REVIEW OF SYSTEMS  General:	  Skin/Breast:	  Ophthalmologic:  ENMT:	  Respiratory and Thorax:	  Cardiovascular:	  Gastrointestinal:	  Genitourinary:	  Musculoskeletal:	  Neurological:	  Psychiatric:	  Hematology/Lymphatics:	  Endocrine:	  Allergic/Immunologic:	    A 10-system ROS was performed and is negative except for those items noted above and/or in the HPI.    PAST MEDICAL & SURGICAL HISTORY:  No pertinent past medical history  hx of left eye cataract surgery   s/p bilateral oophorectomy  s/p kyphoplasty  s/p appendectomy    FAMILY HISTORY:    SOCIAL HISTORY:   T/E/D:   Occupation:   Lives with:     MEDICATIONS (HOME):  Home Medications:    MEDICATIONS  (STANDING):    MEDICATIONS  (PRN):    ALLERGIES/INTOLERANCES:  Allergies  penicillin (Anaphylaxis)    Intolerances    VITALS & EXAMINATION:  Vital Signs Last 24 Hrs  T(C): 36.8 (14 Feb 2020 13:52), Max: 36.8 (14 Feb 2020 13:52)  T(F): 98.2 (14 Feb 2020 13:52), Max: 98.2 (14 Feb 2020 13:52)  HR: 72 (14 Feb 2020 13:52) (72 - 84)  BP: 130/68 (14 Feb 2020 13:52) (130/68 - 171/81)  BP(mean): --  RR: 18 (14 Feb 2020 13:52) (18 - 18)  SpO2: 98% (14 Feb 2020 13:52) (97% - 98%)    General:  Constitutional: Obese Female, appears stated age, in no apparent distress including pain  Head: Normocephalic & atraumatic.  ENT: Patent ear canals, intact TM, mucus membranes moist & pink, neck supple, no lymphadenopathy.   Respiratory: Patent airway. All lung fields are clear to auscultation bilaterally.  Extremities: No cyanosis, clubbing, or edema.  Skin: No rashes, bruising, or discoloration.    Cardiovascular (>2): RRR no murmurs. Carotid pulsations symmetric, no bruits. Normal capillary beds refill, 1-2 seconds or less.     Neurological (>12):  MS: Awake, alert, oriented to person, place, situation, time. Normal affect. Follows all commands.    Language: Speech is clear, fluent with good repetition & comprehension (able to name objects___)    CNs: PERRLA (R = 3mm, L = 3mm). VFF. EOMI no nystagmus, no diplopia. V1-3 intact to LT/pinprick, well developed masseter muscles b/l. No facial asymmetry b/l, full eye closure strength b/l. Hearing grossly normal (rubbing fingers) b/l. Symmetric palate elevation in midline. Gag reflex deferred. Head turning & shoulder shrug intact b/l. Tongue midline, normal movements, no atrophy.    Fundoscopic: pale w/ sharp discs margins No vascular changes.      Motor: Normal muscle bulk & tone. No noticeable tremor or seizure. No pronator drift.              Deltoid	Biceps	Triceps	Wrist	Finger ABd	   R	5	5	5	5	5		5 	  L	5	5	5	5	5		5    	H-Flex	H-Ext	H-ABd	H-ADd	K-Flex	K-Ext	D-Flex	P-Flex  R	5	5	5	5	5	5	5	5 	   L	5	5	5	5	5	5	5	5	     Sensation: Intact to LT/PP/Temp/Vibration/Position b/l throughout.     Cortical: Extinction on DSS (neglect): none    Reflexes:              Biceps(C5)       BR(C6)     Triceps(C7)               Patellar(L4)    Achilles(S1)    Plantar Resp  R	2	          2	             2		        2		    2		Down   L	2	          2	             2		        2		    2		Down     Coordination: intact rapid-alt movements. No dysmetria to FTN/HTS    Gait: Normal Romberg. No postural instability. Normal stance and tandem gait.     LABORATORY:  CBC                       13.7   5.37  )-----------( 195      ( 14 Feb 2020 12:08 )             41.9     Chem 02-14    141  |  103  |  10  ----------------------------<  94  4.2   |  25  |  0.70    Ca    9.6      14 Feb 2020 12:08    TPro  7.8  /  Alb  4.7  /  TBili  0.3  /  DBili  x   /  AST  11  /  ALT  14  /  AlkPhos  63  02-14    LFTs LIVER FUNCTIONS - ( 14 Feb 2020 12:08 )  Alb: 4.7 g/dL / Pro: 7.8 g/dL / ALK PHOS: 63 U/L / ALT: 14 U/L / AST: 11 U/L / GGT: x           Coagulopathy PT/INR - ( 14 Feb 2020 12:08 )   PT: 11.0 sec;   INR: 0.96 ratio         PTT - ( 14 Feb 2020 12:08 )  PTT:29.0 sec  Lipid Panel   A1c   Cardiac enzymes     U/A   CSF  Immunological  Other    STUDIES & IMAGING:  Studies (EKG, EEG, EMG, etc):     Radiology (XR, CT, MR, U/S, TTE/QUE):  < from: CT Head No Cont (02.14.20 @ 12:07) >  Findings:    Hypodensity in the right corona radiata suspicious for ischemia of indeterminate age. No evidence for acute hemorrhage, calvarial fracture, or hydrocephalus.    Mild patchy hypodensity without mass effect is noted in the periventricular white matter which most likely represents chronic microvascular ischemic changes.    Age-appropriate volume loss is present with secondary proportional prominence of the sulci and ventricles.    Under pneumatization of the right mastoid air cells, otherwise the visualized portions of the paranasal sinuses and mastoid air cells are clear.    Status post left cataract surgery.    IMPRESSION:    Hypodensity inthe right corona radiata suspicious for ischemia of indeterminate age.     No evidence for acute hemorrhage, calvarial fracture, or hydrocephalus.    < end of copied text > HPI: 75 yo right handed  woman who presents to [facility] on referral by her neuro-ophthalmologist, Dr. Lee, for 6 day onset of transient painless "curtain-drop" right monocular vision loss lasting seconds w/ spontaneous resolution. ROS also revealed intermittent flashing lights (chronic, years) reported isolated right temporal pain w/ radiation to the neck, but was transient and is not currently symptomatic. Patient was seen by opthalmology outpatient with normal eye exam. Other pertinent hx includes hx of left eye cataract surgery, HLD lifestyle controlled. CTH shows possible right corona radiata hypodensity. Neurology consulted for further evaluation. NIHSS 0 MRS 0.     (Stroke only)  NIHSS: 0  MRS: 0  ICH: N/A     REVIEW OF SYSTEMS  General:	  Skin/Breast:	  Ophthalmologic:  ENMT:	  Respiratory and Thorax:	  Cardiovascular:	  Gastrointestinal:	  Genitourinary:	  Musculoskeletal:	  Neurological:	  Psychiatric:	  Hematology/Lymphatics:	  Endocrine:	  Allergic/Immunologic:	    A 10-system ROS was performed and is negative except for those items noted above and/or in the HPI.    PAST MEDICAL & SURGICAL HISTORY:  HLD  hx of left eye cataract surgery   s/p bilateral oophorectomy  s/p kyphoplasty  s/p appendectomy    FAMILY HISTORY:    SOCIAL HISTORY:   T/E/D:   Occupation:   Lives with:     MEDICATIONS (HOME):  Home Medications:    MEDICATIONS  (STANDING):    MEDICATIONS  (PRN):    ALLERGIES/INTOLERANCES:  Allergies  penicillin (Anaphylaxis)    Intolerances    VITALS & EXAMINATION:  Vital Signs Last 24 Hrs  T(C): 36.8 (14 Feb 2020 13:52), Max: 36.8 (14 Feb 2020 13:52)  T(F): 98.2 (14 Feb 2020 13:52), Max: 98.2 (14 Feb 2020 13:52)  HR: 72 (14 Feb 2020 13:52) (72 - 84)  BP: 130/68 (14 Feb 2020 13:52) (130/68 - 171/81)  BP(mean): --  RR: 18 (14 Feb 2020 13:52) (18 - 18)  SpO2: 98% (14 Feb 2020 13:52) (97% - 98%)    General:  Constitutional: Female, appears stated age, in no apparent distress including pain  Head: Normocephalic & atraumatic.  ENT: neck supple, no lymphadenopathy.   Respiratory: Patent airway.     Cardiovascular (>2): Normal capillary beds refill, 1-2 seconds or less.     Neurological (>12):  MS: Awake, alert, oriented to person, place, situation, time. Normal affect. Follows all commands.    Language: Speech is clear, fluent with good repetition & comprehension (able to name objects)    CNs: PERRLA (R = 3mm, L = 3mm). VFF. EOMI no nystagmus, no diplopia. V1-3 intact to LT well developed masseter muscles b/l. No facial asymmetry b/l, full eye closure strength b/l. Hearing grossly normal b/l. Symmetric palate elevation in midline. Gag reflex deferred. Tongue midline, normal movements, no atrophy.    Fundoscopic: deferred to earlier ophtho evaluation.     Motor: Normal muscle bulk & tone. No noticeable tremor. No pronator drift. 5/5 strength throughout 	     Sensation: Intact to LT throughout     Cortical: Extinction on DSS (neglect): none    Reflexes:              Plantar Resp  R	Down   L	Down     Coordination: No dysmetria to FTN    Gait: Deferred     LABORATORY:  CBC                       13.7   5.37  )-----------( 195      ( 14 Feb 2020 12:08 )             41.9     Chem 02-14    141  |  103  |  10  ----------------------------<  94  4.2   |  25  |  0.70    Ca    9.6      14 Feb 2020 12:08    TPro  7.8  /  Alb  4.7  /  TBili  0.3  /  DBili  x   /  AST  11  /  ALT  14  /  AlkPhos  63  02-14    LFTs LIVER FUNCTIONS - ( 14 Feb 2020 12:08 )  Alb: 4.7 g/dL / Pro: 7.8 g/dL / ALK PHOS: 63 U/L / ALT: 14 U/L / AST: 11 U/L / GGT: x           Coagulopathy PT/INR - ( 14 Feb 2020 12:08 )   PT: 11.0 sec;   INR: 0.96 ratio         PTT - ( 14 Feb 2020 12:08 )  PTT:29.0 sec  Lipid Panel   A1c   Cardiac enzymes     U/A   CSF  Immunological  Other    STUDIES & IMAGING:  Studies (EKG, EEG, EMG, etc):     Radiology (XR, CT, MR, U/S, TTE/QUE):  < from: CT Head No Cont (02.14.20 @ 12:07) >  Findings:    Hypodensity in the right corona radiata suspicious for ischemia of indeterminate age. No evidence for acute hemorrhage, calvarial fracture, or hydrocephalus.    Mild patchy hypodensity without mass effect is noted in the periventricular white matter which most likely represents chronic microvascular ischemic changes.    Age-appropriate volume loss is present with secondary proportional prominence of the sulci and ventricles.        Under pneumatization of the right mastoid air cells, otherwise the visualized portions of the paranasal sinuses and mastoid air cells are clear.    Status post left cataract surgery.    IMPRESSION:    Hypodensity inthe right corona radiata suspicious for ischemia of indeterminate age.     No evidence for acute hemorrhage, calvarial fracture, or hydrocephalus.    < end of copied text >

## 2020-02-15 VITALS
DIASTOLIC BLOOD PRESSURE: 74 MMHG | SYSTOLIC BLOOD PRESSURE: 124 MMHG | RESPIRATION RATE: 18 BRPM | OXYGEN SATURATION: 98 % | HEART RATE: 71 BPM | TEMPERATURE: 98 F

## 2020-02-15 PROCEDURE — 70544 MR ANGIOGRAPHY HEAD W/O DYE: CPT

## 2020-02-15 PROCEDURE — 70450 CT HEAD/BRAIN W/O DYE: CPT

## 2020-02-15 PROCEDURE — 99217: CPT

## 2020-02-15 PROCEDURE — 93306 TTE W/DOPPLER COMPLETE: CPT

## 2020-02-15 PROCEDURE — 70551 MRI BRAIN STEM W/O DYE: CPT

## 2020-02-15 PROCEDURE — 80053 COMPREHEN METABOLIC PANEL: CPT

## 2020-02-15 PROCEDURE — 70544 MR ANGIOGRAPHY HEAD W/O DYE: CPT | Mod: 26,59

## 2020-02-15 PROCEDURE — 70548 MR ANGIOGRAPHY NECK W/DYE: CPT

## 2020-02-15 PROCEDURE — 85027 COMPLETE CBC AUTOMATED: CPT

## 2020-02-15 PROCEDURE — 85730 THROMBOPLASTIN TIME PARTIAL: CPT

## 2020-02-15 PROCEDURE — 85610 PROTHROMBIN TIME: CPT

## 2020-02-15 PROCEDURE — 70548 MR ANGIOGRAPHY NECK W/DYE: CPT | Mod: 26

## 2020-02-15 PROCEDURE — G0378: CPT

## 2020-02-15 PROCEDURE — 80061 LIPID PANEL: CPT

## 2020-02-15 PROCEDURE — 93005 ELECTROCARDIOGRAM TRACING: CPT

## 2020-02-15 PROCEDURE — 83036 HEMOGLOBIN GLYCOSYLATED A1C: CPT

## 2020-02-15 PROCEDURE — 70551 MRI BRAIN STEM W/O DYE: CPT | Mod: 26

## 2020-02-15 PROCEDURE — 99284 EMERGENCY DEPT VISIT MOD MDM: CPT | Mod: 25

## 2020-02-15 PROCEDURE — 85652 RBC SED RATE AUTOMATED: CPT

## 2020-02-15 PROCEDURE — 93306 TTE W/DOPPLER COMPLETE: CPT | Mod: 26

## 2020-02-15 RX ORDER — ATORVASTATIN CALCIUM 80 MG/1
1 TABLET, FILM COATED ORAL
Qty: 14 | Refills: 0
Start: 2020-02-15

## 2020-02-15 NOTE — ED CDU PROVIDER DISPOSITION NOTE - CLINICAL COURSE
75y/o F with PMH compression fractures, stable lung nodule, GERD, arthritis, was sent in by neuro-ophthalmologist for acute episode of right eye vision loss 6 days ago, followed by vision change, but with normal eye exam in office today. Pt states that she had sudden loss of vision like a "curtain" over her right eye that lasted for a few seconds and then completely resolved. notes her vision was normal afterwards. She has been seeing flashing lights for several weeks prior to loss of vision as well as afterwards. She denies floaters. She has intermittent pain in right temporal region that radiates down to her jaw but does not have any pain currently. She was seen by an ophthalmologist yesterday and was referred to see a neuro-ophthalmologist today who referred her for evaluation in ED. Pt currently is coming from neuro-ophthalmologist's office where she had her eyes dilated and had a normal eye exam. denies any blurry vision. denies speech changes, weakness, numbness/tingling, current H/A. denies fever, chills, flu like symptoms, CP, SOB, problems walking, problems going to the bathroom.   In ED, labs unremarkable, CTH showed a hypodensity in R corona radiata 2/2 ischemia, age indeterminate. neuro c/s'd, recommended CDU for continued monitoring and MRI/MRA head.  In CDU, ____________ 73y/o F with PMH compression fractures, stable lung nodule, GERD, arthritis, was sent in by neuro-ophthalmologist for acute episode of right eye vision loss 6 days ago, followed by vision change, but with normal eye exam in office today. Pt states that she had sudden loss of vision like a "curtain" over her right eye that lasted for a few seconds and then completely resolved. notes her vision was normal afterwards. She has been seeing flashing lights for several weeks prior to loss of vision as well as afterwards. She denies floaters. She has intermittent pain in right temporal region that radiates down to her jaw but does not have any pain currently. She was seen by an ophthalmologist yesterday and was referred to see a neuro-ophthalmologist today who referred her for evaluation in ED. Pt currently is coming from neuro-ophthalmologist's office where she had her eyes dilated and had a normal eye exam. denies any blurry vision. denies speech changes, weakness, numbness/tingling, current H/A. denies fever, chills, flu like symptoms, CP, SOB, problems walking, problems going to the bathroom.   In ED, labs unremarkable, CTH showed a hypodensity in R corona radiata 2/2 ischemia, age indeterminate. neuro c/s'd, recommended CDU for continued monitoring and MRI/MRA head.  In CDU, patient had no return of symptoms. No events on telemetry. TTE and MRI unremarkable. Stable for d/c to f/u outpatient.

## 2020-02-15 NOTE — ED CDU PROVIDER DISPOSITION NOTE - PROVIDER TOKENS
PROVIDER:[TOKEN:[[id number]],FOLLOWUP:[1-3 Days]] PROVIDER:[TOKEN:[[id number]],FOLLOWUP:[1-3 Days]],PROVIDER:[TOKEN:[[id number]]]

## 2020-02-15 NOTE — ED CDU PROVIDER DISPOSITION NOTE - CARE PROVIDERS DIRECT ADDRESSES
,[email or web address],DirectAddress_Unknown

## 2020-02-15 NOTE — ED CDU PROVIDER DISPOSITION NOTE - PATIENT PORTAL LINK FT
You can access the FollowMyHealth Patient Portal offered by [facility] by registering at the following website: [email or web address]. By joining [employer]’s FollowMyHealth portal, you will also be able to view your health information using other applications (apps) compatible with our system.

## 2020-02-15 NOTE — ED ADULT NURSE REASSESSMENT NOTE - NSIMPLEMENTINTERV_GEN_ALL_ED
Implemented All Universal Safety Interventions:  [place] to call system. Call bell, personal items and telephone within reach. Instruct patient to call for assistance. Room bathroom lighting operational. Non-slip footwear when patient is off stretcher. Physically safe environment: no spills, clutter or unnecessary equipment. Stretcher in lowest position, wheels locked, appropriate side rails in place.

## 2020-02-15 NOTE — ED ADULT NURSE REASSESSMENT NOTE - NS ED NURSE REASSESS COMMENT FT1
16.30 Pt was evaluated by  CDU MD Yani Hicks  pt is  feeling better Discharged ML out PA  Starla Garrido explained the follow up care & gave the discharge summary  Pt verbalized the understanding on follow up care stable to go home pt has stable vitals steady gait A&OX 2 at the time of discharge
Pt awaiting CDU eval. Denies needs at this time.
Pt neuro check intact, no deficits noted
Pt neuro check intact, no deficits noted. Will continue to monitor.
Pt denies complain of discomfort at this time. Vitals signs stable ; awaiting disposition.
Pt received from SHANNA Gallardo. Pt oriented to CDU & plan of care was discussed. Pt A&O x 3. Pt in CDU for MRI in am, cardiac monitoring, and neuro checks q 4. Pt denies any blurred vision, headache, lightheadedness, or dizziness at this time. Pt neuro check intact, no deficits noted. Pt on a cardiac monitor in NSR, HR in 70's. Pt filled out MRI checklist, faxed to MRI. Pt resting in bed. Safety & comfort measures maintained. Call bell in reach. Will continue to monitor.
07.00 Am Received report from SHANNA Ledbetter  pt is observed for vision changes Awaiting for MRI .Pt is A&OX 4 steady gait  Neuro mentation intact GCS 15/15 MARIA ELENALA  denies vision changes now . Pt has No fever chills cp sob N/V/D headache dizziness .  Comfort care & safety measures initiated  IV site looks clean & dry  no signs of infiltration noted  Pt is advised to call for help if needed call bell with in the reach   Pt verbalized the understanding. continue to monitor .  10.00 Am Pt went for MRI

## 2020-02-15 NOTE — ED CDU PROVIDER SUBSEQUENT DAY NOTE - MEDICAL DECISION MAKING DETAILS
Altered vision which has resolved.  MRI of brain and MRA of head and neck and ECHO and Neuro consult and reassessment.

## 2020-02-15 NOTE — ED CDU PROVIDER SUBSEQUENT DAY NOTE - PROGRESS NOTE DETAILS
CDU NOTE ARGELIA GALINDO: Pt resting comfortably, feeling well without complaint. no flashing lights or vision changes. NAD, VSS. No events on telemetry. neuro exam normal, no deficits. will continue monitoring. Patient seen and evaluated at bedside, resting comfortably in NAD. No complaints at this time, reports all  visual symptoms have resolved. + mild generalized HA. Denies any dizziness, numbness, tingling, weakness, trouble speaking/swallowing, unsteady gait. VSS. Neuro exam unremarkable. No events on telemetry. Pending MRI today. pt without complaints. NO: vision change, numbness, weakness, change in speech, sensation or gait.  Pt awaiting MRI of brain and MRA of head and neck and ECHO and Neuro reassessment. Patient seen and evaluated at bedside, resting comfortably in NAD. No complaints at this time, reports all  visual symptoms have resolved. + mild generalized HA. Denies any dizziness, numbness, tingling, weakness, trouble speaking/swallowing, unsteady gait. VSS. Neuro exam unremarkable. No events on telemetry. Pending MRI and TTE today. Patient resting in bed comfortably in NAD. No complaints. Most recent VSS. No events on telemetry monitor. Pending MRI and TTE results. TTE and MRI unremarkable. Patient seen by neuro team and attending Dr. Jarquin, recommending d/c home on ASA/statin and f/u with Neuro and Optho. Patient with no new symptoms or return of symptoms in CDU. Most recent VSS. Stable for d/c. D/w Dr. Lomeli.

## 2020-02-15 NOTE — ED CDU PROVIDER SUBSEQUENT DAY NOTE - HISTORY
No interval changes since initial CDU provider note. Pt feels well without complaint. no vision changes. NAD VSS. no events on tele. neuro exam normal, no deficits. neuro following. pt to get MRI/MRA head in the morning. will continue monitoring. Randy Edwards

## 2020-02-15 NOTE — ED CDU PROVIDER DISPOSITION NOTE - NSFOLLOWUPINSTRUCTIONS_ED_ALL_ED_FT
1. Continue your home medications as directed. Start Aspirin 81mg daily and Atorvastatin 80mg daily at night.   2. Drink plenty of fluids to stay hydrated.  3. You will need to follow up with your PMD and neurologist or our neurology clinic at [phone] in 2-3 days. A copy of your results were given to you to bring to your appt.   4. Return to ER for headache, confusion, behavior/speech changes, numbness/tingling/weakness in your arms/legs, or any other concerns. Continue your home medications as directed. Start Aspirin 81mg daily and Atorvastatin 80mg daily at night.     Please follow up with your PMD, ophthalmologist, and neurologist Dr. Jarquin upon discharge. A copy of your results were given to you to bring to your appointment.     Return to ER for any new/worsening headache, confusion, behavior/speech changes, new vision changes, numbness/tingling/weakness, trouble walking, or any other concerns. Continue your home medications as directed. Start Aspirin 81mg daily and Atorvastatin 80mg daily at night.     Please follow up with your PMD, ophthalmologist, and neurologist Dr. Jarquin upon discharge. A copy of your results were given to you to bring to your appointment.     Please also follow up with cardiologist upon discharge.     Return to ER for any new/worsening headache, confusion, behavior/speech changes, new vision changes, numbness/tingling/weakness, trouble walking, or any other concerns.

## 2020-02-15 NOTE — ED CDU PROVIDER DISPOSITION NOTE - CARE PROVIDER_API CALL
Mark Anthony Jarquin)  Neurology; Vascular Neurology  [address] [place] [address], [address] [place] [address]  Phone: [phone]  Fax: [phone]  Follow Up Time: 1-3 Days Mark Anthony Jarquin)  Neurology; Vascular Neurology  [address], [address] [place] [address]  Phone: [phone]  Fax: [phone]  Follow Up Time: 1-3 Days    Adis Brown)  Cardiovascular Disease; Nuclear Cardiology  [address] [place] [address]  Phone: [phone]  Fax: [phone]  Follow Up Time:

## 2020-02-20 PROBLEM — M19.90 UNSPECIFIED OSTEOARTHRITIS, UNSPECIFIED SITE: Chronic | Status: ACTIVE | Noted: 2020-02-14

## 2020-02-20 PROBLEM — K21.9 GASTRO-ESOPHAGEAL REFLUX DISEASE WITHOUT ESOPHAGITIS: Chronic | Status: ACTIVE | Noted: 2020-02-14

## 2020-02-20 PROBLEM — M48.50XA COLLAPSED VERTEBRA, NOT ELSEWHERE CLASSIFIED, SITE UNSPECIFIED, INITIAL ENCOUNTER FOR FRACTURE: Chronic | Status: ACTIVE | Noted: 2020-02-14

## 2020-02-20 PROBLEM — R91.1 SOLITARY PULMONARY NODULE: Chronic | Status: ACTIVE | Noted: 2020-02-14

## 2020-03-02 ENCOUNTER — APPOINTMENT (OUTPATIENT)
Dept: RHEUMATOLOGY | Facility: CLINIC | Age: 75
End: 2020-03-02

## 2020-03-03 ENCOUNTER — APPOINTMENT (OUTPATIENT)
Dept: NEUROLOGY | Facility: CLINIC | Age: 75
End: 2020-03-03
Payer: MEDICARE

## 2020-03-03 VITALS
RESPIRATION RATE: 14 BRPM | HEIGHT: 59.5 IN | DIASTOLIC BLOOD PRESSURE: 75 MMHG | WEIGHT: 132 LBS | BODY MASS INDEX: 26.26 KG/M2 | TEMPERATURE: 97.4 F | SYSTOLIC BLOOD PRESSURE: 145 MMHG | OXYGEN SATURATION: 94 % | HEART RATE: 76 BPM

## 2020-03-03 DIAGNOSIS — H53.121 TRANSIENT VISUAL LOSS, RIGHT EYE: ICD-10-CM

## 2020-03-03 PROCEDURE — 99215 OFFICE O/P EST HI 40 MIN: CPT

## 2020-03-03 RX ORDER — RANITIDINE 150 MG/1
150 TABLET ORAL
Qty: 180 | Refills: 3 | Status: DISCONTINUED | COMMUNITY
Start: 2019-07-18 | End: 2020-03-03

## 2020-03-03 NOTE — PHYSICAL EXAM
[General Appearance - Alert] : alert [General Appearance - Well Nourished] : well nourished [General Appearance - Well Developed] : well developed [Oriented To Time, Place, And Person] : oriented to person, place, and time [Mood] : the mood was normal [Affect] : the affect was normal [Person] : oriented to person [Time] : oriented to time [Short Term Intact] : short term memory intact [Place] : oriented to place [Concentration Intact] : normal concentrating ability [Visual Intact] : visual attention was ~T not ~L decreased [Naming Objects] : no difficulty naming common objects [Writing A Sentence] : no difficulty writing a sentence [Repeating Phrases] : no difficulty repeating a phrase [Comprehension] : comprehension intact [Reading] : reading intact [Fluency] : fluency intact [Cranial Nerves Oculomotor (III)] : extraocular motion intact [Past History] : adequate knowledge of personal past history [Cranial Nerves Optic (II)] : visual acuity intact bilaterally,  visual fields full to confrontation, pupils equal round and reactive to light [Cranial Nerves Trigeminal (V)] : facial sensation intact symmetrically [Cranial Nerves Facial (VII)] : face symmetrical [Cranial Nerves Vestibulocochlear (VIII)] : hearing was intact bilaterally [Cranial Nerves Accessory (XI - Cranial And Spinal)] : head turning and shoulder shrug symmetric [Cranial Nerves Glossopharyngeal (IX)] : tongue and palate midline [Cranial Nerves Hypoglossal (XII)] : there was no tongue deviation with protrusion [Motor Strength] : muscle strength was normal in all four extremities [No Muscle Atrophy] : normal bulk in all four extremities [Motor Handedness Right-Handed] : the patient is right hand dominant [Motor Tone] : muscle tone was normal in all four extremities [Sensation Tactile Decrease] : light touch was intact [Balance] : balance was intact [Optic Disc Abnormality] : the optic disc were normal in size and color [Extraocular Movements] : extraocular movements were intact [Full Visual Field] : full visual field [Hearing Threshold Finger Rub Not [place]] : hearing was normal [Neck Appearance] : the appearance of the neck was normal [Heart Rate And Rhythm] : heart rate was normal and rhythm regular [] : no respiratory distress [Edema] : there was no peripheral edema [Abdomen Soft] : soft [Abnormal Walk] : normal gait [Involuntary Movements] : no involuntary movements were seen [Musculoskeletal - Swelling] : no joint swelling seen [Skin Color & Pigmentation] : normal skin color and pigmentation [Skin Turgor] : normal skin turgor [Paresis Pronator Drift Right-Sided] : no pronator drift on the right [Paresis Pronator Drift Left-Sided] : no pronator drift on the left [Motor Strength Upper Extremities Bilaterally] : strength was normal in both upper extremities [Motor Strength Lower Extremities Bilaterally] : strength was normal in both lower extremities [Coordination - Dysmetria Impaired Finger-to-Nose Bilateral] : not present [Dysdiadochokinesia Bilaterally] : not present

## 2020-03-03 NOTE — DISCUSSION/SUMMARY
[Goals and Counseling] : I have reviewed the goals of stroke risk factor modification. I counseled the patient on measures to reduce stroke risk, including the importance of medication compliance, risk factor control, exercise, healthy diet and avoidance of smoking. I reviewed stroke warning signs and symptoms and appropriate actions to take if such occur. [FreeTextEntry1] : Mrs. Jacobson is a 75 year old woman with no PMHx now 3 weeks s/p transient monocular blindness, seen by me in the [facility] ED. CT/MRI showed no acute infarct or hemorrhage and MRA showed no carotid stenosis. We discussed to continue to control her modifiable risk factors and ASA for stroke prevention and decrease the Lipitor to 20 mg. We also discussed the importance of diet and exercise. I will see her again in 1 year. All of her questions and concerns were addressed.

## 2020-03-03 NOTE — HISTORY OF PRESENT ILLNESS
[FreeTextEntry1] : Mrs. Jacobson is a 75 year old woman who presented to [facility] ED on 02/14/2020 with several seconds of right eye transient monocular blindness. She reported having a very stressful morning with her  who has Alzheimer's and then saw a shade close over her right eye. CT head showed small old lacunar infarcts, MRI was negative and MRA showed no carotid stenosis. She was discharged on ASA and Lipitor. She comes in today with no complaints.

## 2020-03-03 NOTE — REVIEW OF SYSTEMS
[Chills] : no chills [Fever] : no fever [Feeling Tired] : not feeling tired [Feeling Poorly] : not feeling poorly [Confused or Disoriented] : no confusion [Memory Lapses or Loss] : no memory loss [Decr. Concentrating Ability] : no decrease in concentrating ability [Difficulty with Language] : no ~M difficulty with language [Changed Thought Patterns] : no change in thought patterns [Facial Weakness] : no facial weakness [Repeating Questions] : no repeated questioning about recent events [Hand Weakness] : no hand weakness [Arm Weakness] : no arm weakness [Leg Weakness] : no leg weakness [Poor Coordination] : good coordination [Difficulty Writing] : no difficulty writing [Difficulties in Speech] : no speech difficulties [Seizures] : no convulsions [Tingling] : no tingling [Numbness] : no numbness [Fainting] : no fainting [Dizziness] : no dizziness [Lightheadedness] : no lightheadedness [Vertigo] : no vertigo [Tension Headache] : no tension-type headache [Inability to Walk] : able to walk [Ataxia] : no ataxia [Difficulty Walking] : no difficulty walking [Frequent Falls] : not falling [Suicidal] : not suicidal [Anxiety] : no anxiety [Depression] : no depression [Red Eyes] : eyes not red [Eyesight Problems] : no eyesight problems [Loss Of Hearing] : no hearing loss [Chest Pain] : no chest pain [Nosebleeds] : no nosebleeds [Shortness Of Breath] : no shortness of breath [Palpitations] : no palpitations [Constipation] : no constipation [Wheezing] : no wheezing [Cough] : no cough [Diarrhea] : no diarrhea [Joint Pain] : no joint pain [Joint Swelling] : no joint swelling [Easy Bleeding] : no tendency for easy bleeding [Skin Lesions] : no skin lesions [Skin Wound] : no skin wound [Easy Bruising] : no tendency for easy bruising

## 2020-04-27 ENCOUNTER — APPOINTMENT (OUTPATIENT)
Dept: RHEUMATOLOGY | Facility: CLINIC | Age: 75
End: 2020-04-27

## 2020-05-26 ENCOUNTER — RX RENEWAL (OUTPATIENT)
Age: 75
End: 2020-05-26

## 2020-07-09 ENCOUNTER — TRANSCRIPTION ENCOUNTER (OUTPATIENT)
Age: 75
End: 2020-07-09

## 2020-07-19 ENCOUNTER — TRANSCRIPTION ENCOUNTER (OUTPATIENT)
Age: 75
End: 2020-07-19

## 2020-08-24 ENCOUNTER — RESULT REVIEW (OUTPATIENT)
Age: 75
End: 2020-08-24

## 2020-08-24 ENCOUNTER — OUTPATIENT (OUTPATIENT)
Dept: OUTPATIENT SERVICES | Facility: HOSPITAL | Age: 75
LOS: 1 days | End: 2020-08-24
Payer: MEDICARE

## 2020-08-24 ENCOUNTER — APPOINTMENT (OUTPATIENT)
Dept: CT IMAGING | Facility: CLINIC | Age: 75
End: 2020-08-24
Payer: MEDICARE

## 2020-08-24 DIAGNOSIS — Z90.49 ACQUIRED ABSENCE OF OTHER SPECIFIED PARTS OF DIGESTIVE TRACT: Chronic | ICD-10-CM

## 2020-08-24 DIAGNOSIS — Z00.8 ENCOUNTER FOR OTHER GENERAL EXAMINATION: ICD-10-CM

## 2020-08-24 DIAGNOSIS — Z98.890 OTHER SPECIFIED POSTPROCEDURAL STATES: Chronic | ICD-10-CM

## 2020-08-24 DIAGNOSIS — Z90.722 ACQUIRED ABSENCE OF OVARIES, BILATERAL: Chronic | ICD-10-CM

## 2020-08-24 PROCEDURE — 71250 CT THORAX DX C-: CPT

## 2020-08-24 PROCEDURE — 71250 CT THORAX DX C-: CPT | Mod: 26

## 2020-09-02 ENCOUNTER — APPOINTMENT (OUTPATIENT)
Dept: THORACIC SURGERY | Facility: CLINIC | Age: 75
End: 2020-09-02
Payer: MEDICARE

## 2020-09-02 PROCEDURE — 99443: CPT | Mod: 95

## 2020-09-02 RX ORDER — ATORVASTATIN CALCIUM 80 MG/1
80 TABLET, FILM COATED ORAL
Qty: 90 | Refills: 3 | Status: DISCONTINUED | COMMUNITY
Start: 2020-02-26 | End: 2020-09-02

## 2020-09-02 RX ORDER — KRILL/OM-3/DHA/EPA/PHOSPHO/AST 1000-230MG
81 CAPSULE ORAL
Refills: 0 | Status: DISCONTINUED | COMMUNITY
End: 2020-09-02

## 2020-09-02 RX ORDER — MULTIVITAMIN
TABLET ORAL
Refills: 0 | Status: DISCONTINUED | COMMUNITY
End: 2020-09-02

## 2020-09-04 NOTE — REASON FOR VISIT
[Verbal consent obtained from patient] : the patient, [unfilled] [Follow-Up: _____] : a [unfilled] follow-up visit [FreeTextEntry4] : Eliza Tran NP

## 2020-09-04 NOTE — ASSESSMENT
[FreeTextEntry1] : 75 year old female, follow up via telephone visit, lung nodule detected several years ago as part of a lung screening program, monitored with serial CT scans since that time.  At prior visit, CT Chest on 4/13/19 revealed RML 1.2 x 0.9 cm groundglass and solid opacity, unchanged since 1/25/2018. \par \par CT Chest on 8/24/2020 reveals:\par - RML 1.7 cm ill-defined groundglass opacity with more central solid component\par - No enlarged mediastinal, hilar, or axillary lymph nodes\par \par I have reviewed the patient's medical records and diagnostic images during the time of this office visit, and I have made the following recommendation:\par 1.  Surgical Resection vs. Close Surveillance\par 2.  Due to her 's poor + declining health, she has elected to continue with close surveillance.\par 3.  Return to office for follow up with CT Chest in 6 months\par \par \par I personally performed the services described in the documentation, reviewed the documentation recorded by the scribe in my presence and it accurately and completely records my words and actions.\par \par I, Eliza Tran, am scribing for and the presence of CLAIRE Painter the following sections HISTORY OF PRESENT ILLNESSES, PAST MEDICAL/FAMILY/SOCIAL HISTORY; REVIEW OF SYSTEMS; VITAL SIGNS; PHYSICAL EXAM; DISPOSITION.

## 2020-09-04 NOTE — HISTORY OF PRESENT ILLNESS
[FreeTextEntry1] : Ms. Jacobson is a 75 year old female who presents today for follow up via telephone visit.  She is a former smoker who presented for initial evaluation in March 2017 of lung nodule detected several years prior as part of a lung screening program.  She has been monitored with serial CT scans since that time.\par \par CT Chest on 1/25/18 revealed unchanged RML 11 mm part solid nodule in the RML. \par CT Chest on 7/19/18 revealed unchanged RML 1.1 cm part solid nodule.\par \par CT Chest on 4/13/19 revealed RML 1.2 x 0.9 cm (series 2 image 78) groundglass and solid opacity, unchanged since 1/25/2018. Mosaic attenuation of the lungs unchanged. Tree-in-bud nodules in bilateral upper lobes unchanged. Biapical opacities unchanged. \par \par CT Chest on 8/24/2020 reveals:\par - RML 1.7 cm ill-defined groundglass opacity with more central solid component\par - No enlarged mediastinal, hilar, or axillary lymph nodes\par \par She reports stable shortness of breath with exertion + climbing stairs.  Otherwise, she denies any fever, chills, cough, chest pain, hemoptysis, or recent illness.  Of note, she reports episode of transient right eye blindness in February 2020, evaluated at [facility] + outpatient by neurology (Dr. Jarquin), workup revealed questionable TIA, patient recently stopped taking her ASA + Lipitor.

## 2020-09-04 NOTE — CONSULT LETTER
[Consult Closing:] : Thank you very much for allowing me to participate in the care of this patient.  If you have any questions, please do not hesitate to contact me. [Please see my note below.] : Please see my note below. [Courtesy Letter:] : I had the pleasure of seeing your patient, [unfilled], in my office today. [Sincerely,] : Sincerely, [FreeTextEntry2] : Dr. Angelo Sabillon (PCP) [FreeTextEntry3] : \par \par \par Adriano Last MD, FACS \par , Division of Thoracic Surgery \par [facility] \par Chief, Thoracic Surgery \par [facility] \par Department of Cardiovascular & Thoracic Surgery \par  \par [facility]

## 2020-09-29 ENCOUNTER — APPOINTMENT (OUTPATIENT)
Dept: FAMILY MEDICINE | Facility: CLINIC | Age: 75
End: 2020-09-29
Payer: MEDICARE

## 2020-09-29 VITALS
WEIGHT: 128.5 LBS | HEART RATE: 81 BPM | BODY MASS INDEX: 25.56 KG/M2 | DIASTOLIC BLOOD PRESSURE: 88 MMHG | RESPIRATION RATE: 12 BRPM | TEMPERATURE: 98.6 F | HEIGHT: 59.5 IN | OXYGEN SATURATION: 95 % | SYSTOLIC BLOOD PRESSURE: 130 MMHG

## 2020-09-29 DIAGNOSIS — M81.0 AGE-RELATED OSTEOPOROSIS W/OUT CURRENT PATHOLOGICAL FRACTURE: ICD-10-CM

## 2020-09-29 PROCEDURE — G0008: CPT

## 2020-09-29 PROCEDURE — 99214 OFFICE O/P EST MOD 30 MIN: CPT | Mod: 25

## 2020-09-29 PROCEDURE — 90686 IIV4 VACC NO PRSV 0.5 ML IM: CPT

## 2020-09-29 PROCEDURE — 90685 IIV4 VACC NO PRSV 0.25 ML IM: CPT

## 2020-09-29 NOTE — HISTORY OF PRESENT ILLNESS
[FreeTextEntry1] : Patient here for multiple issues first of all she has been following with other physicians for a pulmonary nodule which now has found to be slightly enlarged and also for a episode of amaurosis fugax which she experienced several months ago she is on lipid-lowering agents and aspirin for that latter diagnosis and has decided with the other doctor to observe the lung nodule for another 6 months this mostly due to the fact that her  is in a nursing home now and she is very involved in visiting him and taking care of him she also complains of some pain in the neck and the thoracic spine she has known osteoporosis she is in need of a flu shot and also must be tested weekly for COVID due to the visiting requirements for her  [de-identified] : As above

## 2020-09-29 NOTE — PHYSICAL EXAM
[No Acute Distress] : no acute distress [Well Nourished] : well nourished [Well Developed] : well developed [Well-Appearing] : well-appearing [Normal Sclera/Conjunctiva] : normal sclera/conjunctiva [PERRL] : pupils equal round and reactive to light [Normal Outer Ear/Nose] : the outer ears and nose were normal in appearance [Normal Oropharynx] : the oropharynx was normal [No Lymphadenopathy] : no lymphadenopathy [Supple] : supple [No Respiratory Distress] : no respiratory distress  [No Accessory Muscle Use] : no accessory muscle use [Clear to Auscultation] : lungs were clear to auscultation bilaterally [Normal Rate] : normal rate  [Regular Rhythm] : with a regular rhythm [Normal S1, S2] : normal S1 and S2 [No Edema] : there was no peripheral edema [Soft] : abdomen soft [Non Tender] : non-tender [Non-distended] : non-distended [No HSM] : no HSM [Normal Bowel Sounds] : normal bowel sounds [Normal Posterior Cervical Nodes] : no posterior cervical lymphadenopathy [Normal Anterior Cervical Nodes] : no anterior cervical lymphadenopathy [No CVA Tenderness] : no CVA  tenderness [No Spinal Tenderness] : no spinal tenderness [No Rash] : no rash [No Skin Lesions] : no skin lesions [No Focal Deficits] : no focal deficits [Normal Gait] : normal gait [Deep Tendon Reflexes (DTR)] : deep tendon reflexes were 2+ and symmetric [Speech Grossly Normal] : speech grossly normal [Memory Grossly Normal] : memory grossly normal [Normal Affect] : the affect was normal [Alert and Oriented x3] : oriented to person, place, and time [Normal Mood] : the mood was normal

## 2020-09-29 NOTE — ASSESSMENT
[FreeTextEntry1] : Patient be sent for routine laboratory work including COVID testing she will have an x-ray of the thoracic and cervical spine due to the pain she is experiencing those areas she has been told that she should take her atorvastatin for as lipid-lowering agent and this should follow-up closely with the thoracic surgeon with regard to her pulmonary nodule which seems to be enlarging she is been advised to return here for follow-up within a month or 2 laboratory has been ordered and flu shot is given

## 2020-10-02 ENCOUNTER — APPOINTMENT (OUTPATIENT)
Dept: RADIOLOGY | Facility: CLINIC | Age: 75
End: 2020-10-02
Payer: MEDICARE

## 2020-10-02 ENCOUNTER — OUTPATIENT (OUTPATIENT)
Dept: OUTPATIENT SERVICES | Facility: HOSPITAL | Age: 75
LOS: 1 days | End: 2020-10-02
Payer: MEDICARE

## 2020-10-02 DIAGNOSIS — Z90.722 ACQUIRED ABSENCE OF OVARIES, BILATERAL: Chronic | ICD-10-CM

## 2020-10-02 DIAGNOSIS — Z98.890 OTHER SPECIFIED POSTPROCEDURAL STATES: Chronic | ICD-10-CM

## 2020-10-02 DIAGNOSIS — M81.0 AGE-RELATED OSTEOPOROSIS WITHOUT CURRENT PATHOLOGICAL FRACTURE: ICD-10-CM

## 2020-10-02 DIAGNOSIS — Z90.49 ACQUIRED ABSENCE OF OTHER SPECIFIED PARTS OF DIGESTIVE TRACT: Chronic | ICD-10-CM

## 2020-10-02 PROCEDURE — 72070 X-RAY EXAM THORAC SPINE 2VWS: CPT | Mod: 26

## 2020-10-02 PROCEDURE — 72040 X-RAY EXAM NECK SPINE 2-3 VW: CPT | Mod: 26

## 2020-10-02 PROCEDURE — 72040 X-RAY EXAM NECK SPINE 2-3 VW: CPT

## 2020-10-02 PROCEDURE — 72070 X-RAY EXAM THORAC SPINE 2VWS: CPT

## 2020-10-05 LAB
25(OH)D3 SERPL-MCNC: 32.5 NG/ML
ALBUMIN SERPL ELPH-MCNC: 4.6 G/DL
ALP BLD-CCNC: 65 U/L
ALT SERPL-CCNC: 13 U/L
ANION GAP SERPL CALC-SCNC: 20 MMOL/L
APPEARANCE: CLEAR
AST SERPL-CCNC: 18 U/L
BASOPHILS # BLD AUTO: 0.04 K/UL
BASOPHILS NFR BLD AUTO: 0.9 %
BILIRUB SERPL-MCNC: 0.3 MG/DL
BILIRUBIN URINE: NEGATIVE
BLOOD URINE: NEGATIVE
BUN SERPL-MCNC: 12 MG/DL
CALCIUM SERPL-MCNC: 9.6 MG/DL
CHLORIDE SERPL-SCNC: 103 MMOL/L
CHOLEST SERPL-MCNC: 207 MG/DL
CHOLEST/HDLC SERPL: 4.1 RATIO
CO2 SERPL-SCNC: 18 MMOL/L
COLOR: NORMAL
CREAT SERPL-MCNC: 0.76 MG/DL
EOSINOPHIL # BLD AUTO: 0.14 K/UL
EOSINOPHIL NFR BLD AUTO: 3 %
ERYTHROCYTE [SEDIMENTATION RATE] IN BLOOD BY WESTERGREN METHOD: 27 MM/HR
ESTIMATED AVERAGE GLUCOSE: 108 MG/DL
GLUCOSE QUALITATIVE U: NEGATIVE
GLUCOSE SERPL-MCNC: 86 MG/DL
HBA1C MFR BLD HPLC: 5.4 %
HCT VFR BLD CALC: 41.8 %
HDLC SERPL-MCNC: 51 MG/DL
HGB BLD-MCNC: 13.8 G/DL
IMM GRANULOCYTES NFR BLD AUTO: 0.4 %
KETONES URINE: NEGATIVE
LDLC SERPL CALC-MCNC: 137 MG/DL
LEUKOCYTE ESTERASE URINE: NEGATIVE
LYMPHOCYTES # BLD AUTO: 1.68 K/UL
LYMPHOCYTES NFR BLD AUTO: 36.5 %
MAN DIFF?: NORMAL
MCHC RBC-ENTMCNC: 31.4 PG
MCHC RBC-ENTMCNC: 33 GM/DL
MCV RBC AUTO: 95.2 FL
MONOCYTES # BLD AUTO: 0.34 K/UL
MONOCYTES NFR BLD AUTO: 7.4 %
NEUTROPHILS # BLD AUTO: 2.38 K/UL
NEUTROPHILS NFR BLD AUTO: 51.8 %
NITRITE URINE: NEGATIVE
PH URINE: 6
PLATELET # BLD AUTO: 205 K/UL
POTASSIUM SERPL-SCNC: 4.5 MMOL/L
PROT SERPL-MCNC: 7.1 G/DL
PROTEIN URINE: NORMAL
RBC # BLD: 4.39 M/UL
RBC # FLD: 12.7 %
SODIUM SERPL-SCNC: 141 MMOL/L
SPECIFIC GRAVITY URINE: 1.02
TRIGL SERPL-MCNC: 93 MG/DL
TSH SERPL-ACNC: 2.05 UIU/ML
UROBILINOGEN URINE: NORMAL
WBC # FLD AUTO: 4.6 K/UL

## 2020-12-14 ENCOUNTER — APPOINTMENT (OUTPATIENT)
Dept: RHEUMATOLOGY | Facility: CLINIC | Age: 75
End: 2020-12-14
Payer: MEDICARE

## 2020-12-14 VITALS
OXYGEN SATURATION: 96 % | BODY MASS INDEX: 26 KG/M2 | HEIGHT: 59 IN | DIASTOLIC BLOOD PRESSURE: 70 MMHG | SYSTOLIC BLOOD PRESSURE: 110 MMHG | WEIGHT: 129 LBS | HEART RATE: 67 BPM | TEMPERATURE: 97 F

## 2020-12-14 DIAGNOSIS — M76.31 ILIOTIBIAL BAND SYNDROME, RIGHT LEG: ICD-10-CM

## 2020-12-14 DIAGNOSIS — M76.32 ILIOTIBIAL BAND SYNDROME, RIGHT LEG: ICD-10-CM

## 2020-12-14 DIAGNOSIS — R26.81 UNSTEADINESS ON FEET: ICD-10-CM

## 2020-12-14 DIAGNOSIS — M15.9 POLYOSTEOARTHRITIS, UNSPECIFIED: ICD-10-CM

## 2020-12-14 PROCEDURE — 99204 OFFICE O/P NEW MOD 45 MIN: CPT

## 2020-12-21 ENCOUNTER — APPOINTMENT (OUTPATIENT)
Dept: RADIOLOGY | Facility: CLINIC | Age: 75
End: 2020-12-21
Payer: MEDICARE

## 2020-12-21 ENCOUNTER — OUTPATIENT (OUTPATIENT)
Dept: OUTPATIENT SERVICES | Facility: HOSPITAL | Age: 75
LOS: 1 days | End: 2020-12-21
Payer: MEDICARE

## 2020-12-21 DIAGNOSIS — M80.00XS AGE-RELATED OSTEOPOROSIS WITH CURRENT PATHOLOGICAL FRACTURE, UNSPECIFIED SITE, SEQUELA: ICD-10-CM

## 2020-12-21 DIAGNOSIS — Z90.722 ACQUIRED ABSENCE OF OVARIES, BILATERAL: Chronic | ICD-10-CM

## 2020-12-21 DIAGNOSIS — Z98.890 OTHER SPECIFIED POSTPROCEDURAL STATES: Chronic | ICD-10-CM

## 2020-12-21 DIAGNOSIS — Z90.49 ACQUIRED ABSENCE OF OTHER SPECIFIED PARTS OF DIGESTIVE TRACT: Chronic | ICD-10-CM

## 2020-12-21 DIAGNOSIS — M47.22 OTHER SPONDYLOSIS WITH RADICULOPATHY, CERVICAL REGION: ICD-10-CM

## 2020-12-21 PROCEDURE — 77080 DXA BONE DENSITY AXIAL: CPT | Mod: 26

## 2020-12-21 PROCEDURE — 77080 DXA BONE DENSITY AXIAL: CPT

## 2020-12-23 PROBLEM — Z12.11 ENCOUNTER FOR COLORECTAL CANCER SCREENING: Status: RESOLVED | Noted: 2019-07-18 | Resolved: 2020-12-23

## 2020-12-24 PROBLEM — M15.9 GENERALIZED OSTEOARTHRITIS OF MULTIPLE SITES: Status: ACTIVE | Noted: 2020-12-24

## 2020-12-24 PROBLEM — M76.31 ILIOTIBIAL BAND SYNDROME OF BOTH SIDES: Status: ACTIVE | Noted: 2020-12-24

## 2020-12-24 NOTE — PHYSICAL EXAM
[General Appearance - Alert] : alert [General Appearance - In No Acute Distress] : in no acute distress [General Appearance - Well Nourished] : well nourished [Sclera] : the sclera and conjunctiva were normal [Extraocular Movements] : extraocular movements were intact [Oropharynx] : the oropharynx was normal [Neck Appearance] : the appearance of the neck was normal [Auscultation Breath Sounds / Voice Sounds] : lungs were clear to auscultation bilaterally [Heart Rate And Rhythm] : heart rate was normal and rhythm regular [Heart Sounds] : normal S1 and S2 [Edema] : there was no peripheral edema [Bowel Sounds] : normal bowel sounds [Abdomen Soft] : soft [Abdomen Tenderness] : non-tender [No CVA Tenderness] : no ~M costovertebral angle tenderness [No Spinal Tenderness] : no spinal tenderness [Nail Clubbing] : no clubbing  or cyanosis of the fingernails [Musculoskeletal - Swelling] : no joint swelling seen [] : no rash [Motor Exam] : the motor exam was normal [No Focal Deficits] : no focal deficits [Oriented To Time, Place, And Person] : oriented to person, place, and time [Impaired Insight] : insight and judgment were intact [Affect] : the affect was normal [Motor Tone] : muscle strength and tone were normal [FreeTextEntry1] : Slightly unsteady gait, OA changes diffusely, TTP over b/l IT bands  [Skin Color & Pigmentation] : normal skin color and pigmentation

## 2020-12-24 NOTE — HISTORY OF PRESENT ILLNESS
[FreeTextEntry1] : ALCIRA MENDOZA is a 75 year old woman who presents with for evaluation of long standing OP and diffuse OA related pain. \par \par + OP for at least 20 years -- Fosamax x 1 year remotely, d/c 2/2 GI upset, never been on any other meds \par Menses from 10- 44\par At time unsteady on feet, no falls\par Compression fx s/p cement in 2006\par + former tobacco, no ETOH, no chronic steroids \par No parental fx \par + Ca and Vit D supplementation \par Walking 20 min daily \par Dentist UTD-- no upcoming sx \par + GERD \par \par + OA diffusely with pain worse in C spine with occasional radicular pain. Also with milder OA pain in hands and knees. Presently not taking any pain meds and able to do all ADLs.

## 2020-12-24 NOTE — ASSESSMENT
[FreeTextEntry1] : ALCIRA MENDOZA is a 75 year old woman who presents with long standing OP with prior compression fx, without significant treatment in the past, current unsteady gait, IT band pain, diffuse OA and C spine radiculopathy. \par \par - repeat DEXA -- if remains very low would recommend tx with Reclast or Prolia, avoid PO bisphosphonates 2/2 prior GI upset and GERD \par - recent Vit D, Ca, renal fxn stable\par - PT referral\par - Ca 600mg BID with food, Vit D 1000 IU daily to optimize Ca/Vit D to maintain bone health\par - Weight bearing exercise for 30min 3-4x/week to maintain BMD encouraged\par - tylenol BID prn for OA pain \par - RTC in 1 month

## 2021-02-10 ENCOUNTER — OUTPATIENT (OUTPATIENT)
Dept: OUTPATIENT SERVICES | Facility: HOSPITAL | Age: 76
LOS: 1 days | End: 2021-02-10
Payer: MEDICARE

## 2021-02-10 ENCOUNTER — APPOINTMENT (OUTPATIENT)
Dept: CT IMAGING | Facility: CLINIC | Age: 76
End: 2021-02-10
Payer: MEDICARE

## 2021-02-10 ENCOUNTER — RESULT REVIEW (OUTPATIENT)
Age: 76
End: 2021-02-10

## 2021-02-10 DIAGNOSIS — R91.1 SOLITARY PULMONARY NODULE: ICD-10-CM

## 2021-02-10 DIAGNOSIS — Z90.722 ACQUIRED ABSENCE OF OVARIES, BILATERAL: Chronic | ICD-10-CM

## 2021-02-10 DIAGNOSIS — Z98.890 OTHER SPECIFIED POSTPROCEDURAL STATES: Chronic | ICD-10-CM

## 2021-02-10 DIAGNOSIS — Z90.49 ACQUIRED ABSENCE OF OTHER SPECIFIED PARTS OF DIGESTIVE TRACT: Chronic | ICD-10-CM

## 2021-02-10 PROCEDURE — 71250 CT THORAX DX C-: CPT | Mod: 26,MH

## 2021-02-10 PROCEDURE — 71250 CT THORAX DX C-: CPT

## 2021-02-15 NOTE — ED CDU PROVIDER SUBSEQUENT DAY NOTE - TOBACCO USE
Render Risk Assessment In Note?: no
Additional Notes: Patient is to start taking either Benadryl or Claritin qd.\\nweight= 55 pounds
Former smoker
Detail Level: Simple

## 2021-02-24 ENCOUNTER — APPOINTMENT (OUTPATIENT)
Dept: THORACIC SURGERY | Facility: CLINIC | Age: 76
End: 2021-02-24
Payer: MEDICARE

## 2021-02-24 PROCEDURE — 99443: CPT | Mod: 95

## 2021-02-25 NOTE — HISTORY OF PRESENT ILLNESS
[Home] : at home, [unfilled] , at the time of the visit. [Medical Office: ([facility])___] : at the medical office located in  [Verbal consent obtained from patient] : the patient, [unfilled] [FreeTextEntry1] : Ms. Jacobson is a 75 year old female who presents today for follow up via telephone visit. She is a former smoker who presented for initial evaluation in March 2017 of lung nodule detected several years prior as part of a lung screening program. She has been monitored with serial CT scans since that time. Of note, she reports episode of transient right eye blindness in February 2020, evaluated at [facility] + outpatient by neurology (Dr. Jarquin), workup revealed questionable TIA, patient recently stopped taking her ASA + Lipitor. \par \par CT Chest on 1/25/18 revealed unchanged RML 11 mm part solid nodule in the RML. \par \par CT Chest on 7/19/18 revealed unchanged RML 1.1 cm part solid nodule.\par \par CT Chest on 4/13/19 revealed RML 1.2 x 0.9 cm (series 2 image 78) groundglass and solid opacity, unchanged since 1/25/2018. Mosaic attenuation of the lungs unchanged. Tree-in-bud nodules in bilateral upper lobes unchanged. Biapical opacities unchanged. \par \par CT Chest on 8/24/2020 reveals:\par - RML 1.7 cm ill-defined groundglass opacity with more central solid component\par - No enlarged mediastinal, hilar, or axillary lymph nodes\par \par CT Chest w/o contrast on 2/10/2021:\par - Slightly enlarged 1.8 x 0.8 cm RML solid nodule\par - Unchanged tiny clustered nodules RUL\par \par Of note, patient last seen in office 9/2/2020. Due to her husbands declining health, she has elected to continue with close surveillance as opposed to surgical resection. \par \par Patient is followed today via Telephonic visit 6 month CT Chest. Patient denies hospitalizations, shortness of breath, cough, chest pain, fever, chills. \par \par \par

## 2021-02-25 NOTE — ASSESSMENT
[FreeTextEntry1] : Ms. Jacobson is a 75 year old female former smoker who presented for initial evaluation in March 2017 of lung nodule detected several years prior as part of a lung screening program. She has been monitored with serial CT scans since that time.\par \par \par I have reviewed the patient's medical records and diagnostic images at time of this office visit. CT chest reviewed and explained to patient, patient is doing well, I recommended patient to return to office in 6 months with CT Chest without contrast.  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 \par \par \par I have spent 25 minutes on the phone with patient explaining all of the above\par  \par \par I personally performed the services described in the documentation, reviewed the documentation recorded by the scribe in my presence and it accurately and completely records my words and actions.\par \par I, Katie Tubbs NP, am scribing for and in the presence of CLAIRE Painter, the following sections HISTORY OF PRESENT ILLNESS, PAST MEDICAL/FAMILY/SOCIAL HISTORY; REVIEW OF SYSTEMS; VITAL SIGNS; PHYSICAL EXAM; DISPOSITION.\par  \par

## 2021-02-25 NOTE — CONSULT LETTER
[Dear  ___] : Dear  [unfilled], [Consult Letter:] : I had the pleasure of evaluating your patient, [unfilled]. [( Thank you for referring [unfilled] for consultation for _____ )] : Thank you for referring [unfilled] for consultation for [unfilled] [Please see my note below.] : Please see my note below. [Consult Closing:] : Thank you very much for allowing me to participate in the care of this patient.  If you have any questions, please do not hesitate to contact me. [Sincerely,] : Sincerely, [FreeTextEntry2] : Dr. Manuel Sheikh (PCP/referring) [FreeTextEntry3] : Adriano Last MD, FACS \par , Division of Thoracic Surgery \par [facility] \par Chief, Thoracic Surgery \par [facility] \par Department of Cardiovascular & Thoracic Surgery \par  \par [facility] [employer] [facility]\par

## 2021-02-25 NOTE — DATA REVIEWED
[FreeTextEntry1] : CT Chest w/o contrast on 2/10/2021:\par - Slightly enlarged 1.8 x 0.8 cm RML solid nodule\par - Unchanged tiny clustered nodules RUL

## 2021-06-09 NOTE — ED CDU PROVIDER SUBSEQUENT DAY NOTE - ENMT NEGATIVE STATEMENT, MLM
Ears: no ear pain and no hearing problems.Nose: no nasal congestion and no nasal drainage.Mouth/Throat: no dysphagia, no hoarseness and no throat pain.Neck: no lumps, no pain, no stiffness and no swollen glands (2) Male

## 2021-07-08 ENCOUNTER — APPOINTMENT (OUTPATIENT)
Dept: FAMILY MEDICINE | Facility: CLINIC | Age: 76
End: 2021-07-08
Payer: MEDICARE

## 2021-07-08 VITALS
WEIGHT: 124 LBS | SYSTOLIC BLOOD PRESSURE: 124 MMHG | HEIGHT: 59 IN | BODY MASS INDEX: 25 KG/M2 | OXYGEN SATURATION: 97 % | TEMPERATURE: 97.8 F | DIASTOLIC BLOOD PRESSURE: 73 MMHG

## 2021-07-08 DIAGNOSIS — L30.9 DERMATITIS, UNSPECIFIED: ICD-10-CM

## 2021-07-08 PROCEDURE — 99213 OFFICE O/P EST LOW 20 MIN: CPT

## 2021-07-08 NOTE — HISTORY OF PRESENT ILLNESS
[FreeTextEntry8] : Patient is here due to a skin rash she is concerned that she might have zoster this she has noticed over the past several weeks apparently started underneath her breasts and then to her arms and to her legs there is no fever chills or systemic symptoms no exposures to medications or foods no systemic issues or other complaints she wonders whether it may be due to stress

## 2021-07-08 NOTE — PHYSICAL EXAM
[Normal] : normal gait, coordination grossly intact, no focal deficits and deep tendon reflexes were 2+ and symmetric [de-identified] : Patient has at most what may be a fine reticulated slightly erythematous rash on her arms almost imperceptible

## 2021-07-08 NOTE — ASSESSMENT
[FreeTextEntry1] : Very mild rash bilateral almost imperceptible certainly not zoster patient has been reassured she will be referred to dermatology and also have routine laboratory work done she has stopped taking her alendronate and a statin drug we will follow up with her after the laboratory work is available to us

## 2021-07-12 LAB
25(OH)D3 SERPL-MCNC: 32.4 NG/ML
BASOPHILS # BLD AUTO: 0.04 K/UL
BASOPHILS NFR BLD AUTO: 0.8 %
EOSINOPHIL # BLD AUTO: 0.16 K/UL
EOSINOPHIL NFR BLD AUTO: 3.1 %
HCT VFR BLD CALC: 40.8 %
HGB BLD-MCNC: 13.5 G/DL
IMM GRANULOCYTES NFR BLD AUTO: 0.2 %
LYMPHOCYTES # BLD AUTO: 2.1 K/UL
LYMPHOCYTES NFR BLD AUTO: 40.8 %
MAN DIFF?: NORMAL
MCHC RBC-ENTMCNC: 31.4 PG
MCHC RBC-ENTMCNC: 33.1 GM/DL
MCV RBC AUTO: 94.9 FL
MONOCYTES # BLD AUTO: 0.35 K/UL
MONOCYTES NFR BLD AUTO: 6.8 %
NEUTROPHILS # BLD AUTO: 2.49 K/UL
NEUTROPHILS NFR BLD AUTO: 48.3 %
PLATELET # BLD AUTO: 193 K/UL
RBC # BLD: 4.3 M/UL
RBC # FLD: 12.9 %
TSH SERPL-ACNC: 2.15 UIU/ML
WBC # FLD AUTO: 5.15 K/UL

## 2021-07-13 LAB
ALBUMIN SERPL ELPH-MCNC: 4.5 G/DL
ALP BLD-CCNC: 70 U/L
ALT SERPL-CCNC: 11 U/L
ANION GAP SERPL CALC-SCNC: 22 MMOL/L
AST SERPL-CCNC: 21 U/L
BILIRUB SERPL-MCNC: 0.3 MG/DL
BUN SERPL-MCNC: 12 MG/DL
CALCIUM SERPL-MCNC: 9.5 MG/DL
CHLORIDE SERPL-SCNC: 105 MMOL/L
CHOLEST SERPL-MCNC: 189 MG/DL
CO2 SERPL-SCNC: 16 MMOL/L
CREAT SERPL-MCNC: 0.76 MG/DL
ERYTHROCYTE [SEDIMENTATION RATE] IN BLOOD BY WESTERGREN METHOD: 22 MM/HR
ESTIMATED AVERAGE GLUCOSE: 108 MG/DL
GLUCOSE SERPL-MCNC: 86 MG/DL
HBA1C MFR BLD HPLC: 5.4 %
HDLC SERPL-MCNC: 44 MG/DL
LDLC SERPL CALC-MCNC: 111 MG/DL
NONHDLC SERPL-MCNC: 144 MG/DL
POTASSIUM SERPL-SCNC: 4.7 MMOL/L
PROT SERPL-MCNC: 7.1 G/DL
SODIUM SERPL-SCNC: 143 MMOL/L
TRIGL SERPL-MCNC: 168 MG/DL

## 2021-08-07 ENCOUNTER — APPOINTMENT (OUTPATIENT)
Dept: CT IMAGING | Facility: CLINIC | Age: 76
End: 2021-08-07
Payer: MEDICARE

## 2021-08-07 ENCOUNTER — OUTPATIENT (OUTPATIENT)
Dept: OUTPATIENT SERVICES | Facility: HOSPITAL | Age: 76
LOS: 1 days | End: 2021-08-07
Payer: MEDICARE

## 2021-08-07 DIAGNOSIS — Z90.49 ACQUIRED ABSENCE OF OTHER SPECIFIED PARTS OF DIGESTIVE TRACT: Chronic | ICD-10-CM

## 2021-08-07 DIAGNOSIS — Z90.722 ACQUIRED ABSENCE OF OVARIES, BILATERAL: Chronic | ICD-10-CM

## 2021-08-07 DIAGNOSIS — R91.8 OTHER NONSPECIFIC ABNORMAL FINDING OF LUNG FIELD: ICD-10-CM

## 2021-08-07 DIAGNOSIS — Z98.890 OTHER SPECIFIED POSTPROCEDURAL STATES: Chronic | ICD-10-CM

## 2021-08-07 PROCEDURE — 71250 CT THORAX DX C-: CPT

## 2021-08-07 PROCEDURE — 71250 CT THORAX DX C-: CPT | Mod: 26,MH

## 2021-08-12 ENCOUNTER — TRANSCRIPTION ENCOUNTER (OUTPATIENT)
Age: 76
End: 2021-08-12

## 2021-08-18 ENCOUNTER — APPOINTMENT (OUTPATIENT)
Dept: THORACIC SURGERY | Facility: CLINIC | Age: 76
End: 2021-08-18
Payer: MEDICARE

## 2021-08-18 PROCEDURE — 99443: CPT | Mod: 95

## 2021-08-20 NOTE — HISTORY OF PRESENT ILLNESS
[Home] : at home, [unfilled] , at the time of the visit. [Medical Office: ([facility])___] : at the medical office located in  [Verbal consent obtained from patient] : the patient, [unfilled] [FreeTextEntry1] : Ms. ALCIRA MENDOZA, 76 year old female, former smoker who presented for initial evaluation in March 2017 for lung nodule detected several years prior as part of a lung screening program. She has been monitored with serial CT scans since that time. Of note, she reports episode of transient right eye blindness in February 2020, evaluated at [facility] + outpatient by neurology (Dr. Jarquin), workup revealed questionable TIA, patient recently stopped taking her ASA + Lipitor. \par \par Of note, due to her  declining health she elected to continue with close surveillance as opposed to surgical resection. \par \par CT Chest on 8/24/2020 reveals:\par - RML 1.7 cm ill-defined groundglass opacity with more central solid component\par - No enlarged mediastinal, hilar, or axillary lymph nodes\par \par CT Chest w/o contrast on 2/10/2021:\par - Slightly enlarged 1.8 x 0.8 cm RML solid nodule\par - Unchanged tiny clustered nodules RUL\par \par CT Chest on 8/7/21:\par - 1.6 x 0.9 cm right middle lobe part solid nodule is minimally enlarged from the prior study and more enlarged from older studies.\par \par Patient is followed today via Telephonic visit. Of note, last visit 2/2021; I recommended patient to return to office in 6 months with CT Chest without contrast.  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 \par \par \par

## 2021-08-20 NOTE — ASSESSMENT
[FreeTextEntry1] : Ms. ALCIRA MENDOZA, 76 year old female, former smoker who presented for initial evaluation in March 2017 for lung nodule detected several years prior as part of a lung screening program. She has been monitored with serial CT scans since that time. Of note, she reports episode of transient right eye blindness in February 2020, evaluated at [facility] + outpatient by neurology (Dr. Jarquin), workup revealed questionable TIA, patient recently stopped taking her ASA + Lipitor. \par \par CT reviewed with pt, RML lung nodule has grown in size and suspicious for an indolent cancer. I recommended to surgically remove it. Risks and benefits and alternatives explained to patient. I also reviewed the risks of disease progression to the surrounding LN if it was cancerous. I strongly encourage pt to come to the office in the person with her family to discuss more regarding the surgery.  Pt verbalized understanding and will discuss with her family.\par \par \par I personally performed the services described in the documentation, reviewed the documentation recorded by the scribe in my presence and it accurately and completely records my words and actions. \par \par I, Geri Dia, NP, am scribing for and the presence of Dr. Adriano Last the following sections, HISTORY OF PRESENT ILLNESS, PAST MEDICAL/FAMILY/SOCIAL HISTORY; REVIEW OF SYSTEMS; VITAL SIGNS; PHYSICAL EXAM; DISPOSITION.\par \par \par \par

## 2021-08-20 NOTE — CONSULT LETTER
[Dear  ___] : Dear  [unfilled], [Courtesy Letter:] : I had the pleasure of seeing your patient, [unfilled], in my office today. [Please see my note below.] : Please see my note below. [Sincerely,] : Sincerely, [FreeTextEntry2] : Dr. Manuel Sheikh (PCP/referring) [FreeTextEntry3] : Adriano Last MD, FACS \par , Division of Thoracic Surgery \par [facility] \par Chief, Thoracic Surgery \par [facility] \par Department of Cardiovascular & Thoracic Surgery \par  \[facility]\par

## 2021-09-29 ENCOUNTER — APPOINTMENT (OUTPATIENT)
Dept: THORACIC SURGERY | Facility: CLINIC | Age: 76
End: 2021-09-29
Payer: MEDICARE

## 2021-09-29 VITALS
HEART RATE: 94 BPM | OXYGEN SATURATION: 96 % | SYSTOLIC BLOOD PRESSURE: 161 MMHG | RESPIRATION RATE: 18 BRPM | DIASTOLIC BLOOD PRESSURE: 84 MMHG | BODY MASS INDEX: 25.91 KG/M2 | TEMPERATURE: 97.6 F | WEIGHT: 132 LBS | HEIGHT: 60 IN

## 2021-09-29 PROCEDURE — 99215 OFFICE O/P EST HI 40 MIN: CPT

## 2021-10-02 NOTE — PHYSICAL EXAM
[] : no respiratory distress [Auscultation Breath Sounds / Voice Sounds] : lungs were clear to auscultation bilaterally [Heart Rate And Rhythm] : heart rate was normal and rhythm regular [Heart Sounds] : normal S1 and S2 [Heart Sounds Gallop] : no gallops [Murmurs] : no murmurs [Heart Sounds Pericardial Friction Rub] : no pericardial rub [Examination Of The Chest] : the chest was normal in appearance [Chest Visual Inspection Thoracic Asymmetry] : no chest asymmetry [Diminished Respiratory Excursion] : normal chest expansion [FreeTextEntry1] : generalized rash

## 2021-10-02 NOTE — ASSESSMENT
[FreeTextEntry1] : Ms. ALCIRA MENDOZA, 76 year old female, former smoker who presented for initial evaluation in March 2017 for lung nodule detected several years prior as part of a lung screening program. She has been monitored with serial CT scans since that time. Of note, she reports episode of transient right eye blindness in February 2020, evaluated at [facility] + outpatient by neurology (Dr. Jarquin), workup revealed questionable TIA, patient recently stopped taking her ASA + Lipitor. \par \par Again reviewed and compared all her CT scans, also d/w Radiologist Dr. Finney which thinks that it is suspicious for malignancy since lung nodule has slowly increased in size over past year, I recommended a Rt VATS Robotic-assisted RML wedge rxn, possible RMLobectomy, Risks and benefits and alternatives explained to patient, all questions answered, patient wants to discuss with her families more, she will call us back with her decision.\par If she would proceed with surgery, then she will need PFTs, Medical clearance, PST, and COVID testing prior to surgery.\par \par \par I personally performed the services described in the documentation, reviewed the documentation recorded by the scribe in my presence and it accurately and completely records my words and actions.\par \par I, Alexandr Vargas NP, am scribing for and the presence of CLAIRE Painter, the following sections HISTORY OF PRESENT ILLNESS, PAST MEDICAL/FAMILY/SOCIAL HISTORY; REVIEW OF SYSTEMS; VITAL SIGNS; PHYSICAL EXAM; DISPOSITION.\par \par \par

## 2021-10-02 NOTE — CONSULT LETTER
[Dear  ___] : Dear  [unfilled], [Courtesy Letter:] : I had the pleasure of seeing your patient, [unfilled], in my office today. [Please see my note below.] : Please see my note below. [Sincerely,] : Sincerely, [FreeTextEntry2] : Dr. Manuel Sheikh (PCP/referring) [FreeTextEntry3] : Adriano Last MD, FACS \par , Division of Thoracic Surgery \par [facility] \par Chief, Thoracic Surgery \par [facility] \par Department of Cardiovascular & Thoracic Surgery \par  \par [employer] [facility]\par

## 2021-10-02 NOTE — HISTORY OF PRESENT ILLNESS
[Home] : at home, [unfilled] , at the time of the visit. [Medical Office: ([facility])___] : at the medical office located in  [Verbal consent obtained from patient] : the patient, [unfilled] [FreeTextEntry1] : Ms. ALCIRA MENDOZA, 76 year old female, former smoker who presented for initial evaluation in March 2017 for lung nodule detected several years prior as part of a lung screening program. She has been monitored with serial CT scans since that time. Of note, she reports episode of transient right eye blindness in February 2020, evaluated at [facility] + outpatient by neurology (Dr. Jarquin), workup revealed questionable TIA, patient recently stopped taking her ASA + Lipitor. \par \par Of note, due to her  declining health she elected to continue with close surveillance as opposed to surgical resection. \par \par CT Chest on 8/24/2020 reveals:\par - RML 1.7 cm ill-defined groundglass opacity with more central solid component\par - No enlarged mediastinal, hilar, or axillary lymph nodes\par \par CT Chest w/o contrast on 2/10/2021:\par - Slightly enlarged 1.8 x 0.8 cm RML solid nodule\par - Unchanged tiny clustered nodules RUL\par \par CT Chest on 8/7/21:\par - 1.6 x 0.9 cm right middle lobe part solid nodule is minimally enlarged from the prior study and more enlarged from older studies.\par \par Patient is here today for a follow up, to discuss surgery. Admits to SOB, worsening, generalized rash, and anxiety.\par \par Of note, last visit (telehealth) 8/18/21 CT reviewed with pt, RML lung nodule has grown in size and suspicious for an indolent cancer. I recommended to surgically remove it but patient was not able to make a decision then.\par \par

## 2021-11-01 ENCOUNTER — EMERGENCY (EMERGENCY)
Facility: HOSPITAL | Age: 76
LOS: 1 days | Discharge: HOME | End: 2021-11-01
Attending: EMERGENCY MEDICINE | Admitting: EMERGENCY MEDICINE
Payer: MEDICARE

## 2021-11-01 VITALS
SYSTOLIC BLOOD PRESSURE: 174 MMHG | HEIGHT: 60 IN | HEART RATE: 98 BPM | OXYGEN SATURATION: 97 % | WEIGHT: 130.07 LBS | DIASTOLIC BLOOD PRESSURE: 90 MMHG | RESPIRATION RATE: 16 BRPM | TEMPERATURE: 99 F

## 2021-11-01 DIAGNOSIS — Z90.49 ACQUIRED ABSENCE OF OTHER SPECIFIED PARTS OF DIGESTIVE TRACT: Chronic | ICD-10-CM

## 2021-11-01 DIAGNOSIS — Z98.890 OTHER SPECIFIED POSTPROCEDURAL STATES: Chronic | ICD-10-CM

## 2021-11-01 DIAGNOSIS — Z90.722 ACQUIRED ABSENCE OF OVARIES, BILATERAL: Chronic | ICD-10-CM

## 2021-11-01 PROCEDURE — 72125 CT NECK SPINE W/O DYE: CPT | Mod: 26,MA

## 2021-11-01 PROCEDURE — 70486 CT MAXILLOFACIAL W/O DYE: CPT | Mod: MA

## 2021-11-01 PROCEDURE — 70486 CT MAXILLOFACIAL W/O DYE: CPT | Mod: 26,MA

## 2021-11-01 PROCEDURE — 70450 CT HEAD/BRAIN W/O DYE: CPT | Mod: MA

## 2021-11-01 PROCEDURE — 73110 X-RAY EXAM OF WRIST: CPT | Mod: 26,RT,76

## 2021-11-01 PROCEDURE — 73130 X-RAY EXAM OF HAND: CPT | Mod: 26,RT

## 2021-11-01 PROCEDURE — 73080 X-RAY EXAM OF ELBOW: CPT

## 2021-11-01 PROCEDURE — 70450 CT HEAD/BRAIN W/O DYE: CPT | Mod: 26,MA

## 2021-11-01 PROCEDURE — 73080 X-RAY EXAM OF ELBOW: CPT | Mod: 26,RT

## 2021-11-01 PROCEDURE — 99285 EMERGENCY DEPT VISIT HI MDM: CPT | Mod: 25

## 2021-11-01 PROCEDURE — 73090 X-RAY EXAM OF FOREARM: CPT | Mod: 26,LT

## 2021-11-01 PROCEDURE — 73090 X-RAY EXAM OF FOREARM: CPT

## 2021-11-01 PROCEDURE — 99285 EMERGENCY DEPT VISIT HI MDM: CPT

## 2021-11-01 PROCEDURE — 73100 X-RAY EXAM OF WRIST: CPT

## 2021-11-01 PROCEDURE — 73110 X-RAY EXAM OF WRIST: CPT

## 2021-11-01 PROCEDURE — 72125 CT NECK SPINE W/O DYE: CPT | Mod: MA

## 2021-11-01 PROCEDURE — 25600 CLTX DST RDL FX/EPHYS SEP WO: CPT | Mod: RT

## 2021-11-01 PROCEDURE — 73130 X-RAY EXAM OF HAND: CPT

## 2021-11-01 RX ORDER — METOCLOPRAMIDE HCL 10 MG
10 TABLET ORAL ONCE
Refills: 0 | Status: COMPLETED | OUTPATIENT
Start: 2021-11-01 | End: 2021-11-01

## 2021-11-01 RX ORDER — HYDROMORPHONE HYDROCHLORIDE 2 MG/ML
0.5 INJECTION INTRAMUSCULAR; INTRAVENOUS; SUBCUTANEOUS ONCE
Refills: 0 | Status: DISCONTINUED | OUTPATIENT
Start: 2021-11-01 | End: 2021-11-01

## 2021-11-01 RX ORDER — ONDANSETRON 8 MG/1
4 TABLET, FILM COATED ORAL ONCE
Refills: 0 | Status: COMPLETED | OUTPATIENT
Start: 2021-11-01 | End: 2021-11-01

## 2021-11-01 RX ORDER — METOCLOPRAMIDE HCL 10 MG
1 TABLET ORAL
Qty: 12 | Refills: 0
Start: 2021-11-01 | End: 2021-11-03

## 2021-11-01 RX ADMIN — HYDROMORPHONE HYDROCHLORIDE 0.5 MILLIGRAM(S): 2 INJECTION INTRAMUSCULAR; INTRAVENOUS; SUBCUTANEOUS at 20:47

## 2021-11-01 RX ADMIN — Medication 10 MILLIGRAM(S): at 23:10

## 2021-11-01 RX ADMIN — ONDANSETRON 4 MILLIGRAM(S): 8 TABLET, FILM COATED ORAL at 20:02

## 2021-11-01 RX ADMIN — ONDANSETRON 4 MILLIGRAM(S): 8 TABLET, FILM COATED ORAL at 21:48

## 2021-11-01 RX ADMIN — HYDROMORPHONE HYDROCHLORIDE 0.5 MILLIGRAM(S): 2 INJECTION INTRAMUSCULAR; INTRAVENOUS; SUBCUTANEOUS at 20:01

## 2021-11-01 RX ADMIN — HYDROMORPHONE HYDROCHLORIDE 0.5 MILLIGRAM(S): 2 INJECTION INTRAMUSCULAR; INTRAVENOUS; SUBCUTANEOUS at 20:16

## 2021-11-01 RX ADMIN — HYDROMORPHONE HYDROCHLORIDE 0.5 MILLIGRAM(S): 2 INJECTION INTRAMUSCULAR; INTRAVENOUS; SUBCUTANEOUS at 21:02

## 2021-11-01 NOTE — ED ADULT NURSE NOTE - NSICDXFAMILYHX_GEN_ALL_CORE_FT
FAMILY HISTORY:  Father  Still living? Unknown  Family history of valvular heart disease, Age at diagnosis: Age Unknown    Mother  Still living? Unknown  Family history of coronary artery disease, Age at diagnosis: Age Unknown

## 2021-11-01 NOTE — ED ADULT NURSE NOTE - ED COMFORT CARE
Patient informed/Family informed/Explanation of wait/Warm blanket/Mouth care/Incontinence care/Book/[place]/Pillow/Darkened room/TV

## 2021-11-01 NOTE — ED PROVIDER NOTE - NSFOLLOWUPINSTRUCTIONS_ED_ALL_ED_FT
ice to face  keep wrist in splint and sling until seen by ortho  elevate as much as possible  percocet for severe pain, take with stool softener as narcotic medication can cause constipation   follow up with Dr. Christianson 1-2 days, his office will contact you  follow up with plastic surgeon for zygomatic fracture, referral provided         Wrist Fracture in Adults    WHAT YOU NEED TO KNOW:    A wrist fracture is a break in one or more of the bones in your wrist.     Adult Arm Bones         DISCHARGE INSTRUCTIONS:    Return to the emergency department if:   •Your pain gets worse or does not get better after you take pain medicine.      •Your cast or splint breaks, gets wet, or is damaged.      •Your hand or fingers feel numb or cold.      •Your hand or fingers turn white or blue.      •Your splint or cast feels too tight.      •You have more pain or swelling after the cast or splint is put on.      Call your doctor if:   •You have a fever.      •There is a foul smell or blood coming from under the cast.      •You have questions or concerns about your condition or care.      Medicines: You may need any of the following:   •Prescription pain medicine may be given. Ask your healthcare provider how to take this medicine safely. Some prescription pain medicines contain acetaminophen. Do not take other medicines that contain acetaminophen without talking to your healthcare provider. Too much acetaminophen may cause liver damage. Prescription pain medicine may cause constipation. Ask your healthcare provider how to prevent or treat constipation.       •NSAIDs, such as ibuprofen, help decrease swelling, pain, and fever. NSAIDs can cause stomach bleeding or kidney problems in certain people. If you take blood thinner medicine, always ask your healthcare provider if NSAIDs are safe for you. Always read the medicine label and follow directions.      •Acetaminophen decreases pain and fever. It is available without a doctor's order. Ask how much to take and how often to take it. Follow directions. Read the labels of all other medicines you are using to see if they also contain acetaminophen, or ask your doctor or pharmacist. Acetaminophen can cause liver damage if not taken correctly. Do not use more than 4 grams (4,000 milligrams) total of acetaminophen in one day.       •Take your medicine as directed. Contact your healthcare provider if you think your medicine is not helping or if you have side effects. Tell him or her if you are allergic to any medicine. Keep a list of the medicines, vitamins, and herbs you take. Include the amounts, and when and why you take them. Bring the list or the pill bottles to follow-up visits. Carry your medicine list with you in case of an emergency.      Self-care:   •Rest as much as possible. Do not play contact sports until the healthcare provider says it is okay.      •Apply ice on your wrist for 15 to 20 minutes every hour or as directed. Use an ice pack, or put crushed ice in a plastic bag. Cover it with a towel before you place it on your skin. Ice helps prevent tissue damage and decreases swelling and pain.      •Elevate your wrist above the level of your heart as often as possible. This will help decrease swelling and pain. Prop your wrist on pillows or blankets to keep it elevated comfortably.             Cast or splint care:   •You may take a bath or shower as directed. Do not let your cast or splint get wet. Before bathing, cover the cast or splint with 2 plastic trash bags. Tape the bags to your skin above the cast or splint to seal out the water. Keep your arm out of the water in case the bag breaks. If a plaster cast gets wet and soft, call your healthcare provider.      •Check the skin around the cast or splint every day. You may put lotion on any red or sore areas.      •Do not push down or lean on the cast or brace because it may break.      •Do not scratch the skin under the cast by putting a sharp or pointed object inside the cast.      Go to physical therapy as directed: You may need physical therapy after your wrist heals and the cast is removed. A physical therapist can teach you exercises to help improve movement and strength and to decrease pain.    Follow up with your doctor or bone specialist as directed: You may need to return to have your cast removed. You may also need an x-ray to check how well the bone has healed. Write down your questions so you remember to ask them during your visits.

## 2021-11-01 NOTE — ED ADULT TRIAGE NOTE - CHIEF COMPLAINT QUOTE
" I was walking outside when I tripped on the sidewalk ( uneven sidewalk ) and fell and hit my right side face and right forearm " Pt denies LOC but pt presented with pain right side of head , face, jaw and forearm/hand  Pt denies taking blood thinners

## 2021-11-01 NOTE — ED PROVIDER NOTE - CARE PROVIDER_API CALL
Nolberto Christianson)  Orthopaedic Surgery  [address]  [place] [address]  Phone: [phone]  Fax: [phone]  Follow Up Time: 1-3 Days    Manuel Prescott)  Plastic Surgery  [address] [address]  Phone: [phone]  Fax: [phone]  Follow Up Time: 1-3 Days

## 2021-11-01 NOTE — ED PROVIDER NOTE - PROGRESS NOTE DETAILS
spoke with Dr. Christianson. will come to ED for reduction Angela MCKEE for ED attending, Dr. Yap: 75 y/o F w/ PMHx of lung nodule, GERD, compression fracture, arthritis presents to ED c/o R facial pain, R forearm, and R wrist pain s/p trip and fall today. Pt reports she fell in parking lot and tried to break her fall w/ her hands. Denies LOC, leg pain, neck pain. Pt was able to ambulate after fall. On exam: slight abrasion ecchymosis along R inferior orbital region, R wrist deformity, +2 radial pulse, full ROM in b/l hips, shoulders EOMI. Plan XR showing displaced colles fracture CT head neck fact r/o ich fracture and ortho consult. fracture was reduced by Dr. Christianson and he applied splint. will follow up on office 1-2 days. CT results discussed with patient and family. will follow up with plastic surgeon. will provide referral. patient has nausea after given Dilaudid and vomited. nausea meds given in ED and will continue to monitor. Dr. Yap will assume care Angela MCKEE for ED attending, Dr. Yap: 77 y/o F w/ PMHx of lung nodule, GERD, compression fracture, arthritis presents to ED c/o R facial pain, R forearm, and R wrist pain s/p trip and fall today. Pt reports she fell in parking lot and tried to break her fall w/ her hands. Denies LOC, leg pain, neck pain. Pt was able to ambulate after fall. On exam: slight abrasion ecchymosis along R inferior orbital region, R wrist deformity, +2 radial pulse, full ROM in b/l hips, shoulders EOMI. Plan XR showing displaced colles fracture CT face showing zygomatic fx, ortho consult. f/u with outpatient plastics feels better wants to go home

## 2021-11-01 NOTE — ED PROVIDER NOTE - MUSCULOSKELETAL, MLM
no vert tenderness or step off deformities. +tenderness and deformity to right wrist. mild tenderness to right forearm and elbow. no shoulder tenderness

## 2021-11-01 NOTE — ED PROVIDER NOTE - PATIENT PORTAL LINK FT
You can access the FollowMyHealth Patient Portal offered by [facility] by registering at the following website: [email or web address]. By joining [employer] [facility]’s FollowMyHealth portal, you will also be able to view your health information using other applications (apps) compatible with our system.

## 2021-11-01 NOTE — ED ADULT NURSE NOTE - NSICDXPASTSURGICALHX_GEN_ALL_CORE_FT
PAST SURGICAL HISTORY:  History of appendectomy     History of bilateral oophorectomy     History of kyphoplasty

## 2021-11-01 NOTE — ED ADULT NURSE NOTE - FALL CAUSE
slipping, stumbling. tripping Rifampin Counseling: I discussed with the patient the risks of rifampin including but not limited to liver damage, kidney damage, red-orange body fluids, nausea/vomiting and severe allergy.

## 2021-11-01 NOTE — ED PROVIDER NOTE - CLINICAL SUMMARY MEDICAL DECISION MAKING FREE TEXT BOX
right facial pain and wrist forearm and wrist pain after mechanical fall. obvious deformity. suspect fx and will need reduction. CT head and maxillofacial bones to eval for ICH or facial fx. x-ray forearm and wrist. pain control. consult ortho

## 2021-11-01 NOTE — ED PROVIDER NOTE - NEUROLOGICAL, MLM
Alert and oriented, no focal deficits, no motor or sensory deficits. +radial pulses. cap refill < 2 sec

## 2021-11-01 NOTE — ED PROVIDER NOTE - PROVIDER TOKENS
PROVIDER:[TOKEN:[[id number]],FOLLOWUP:[1-3 Days]],PROVIDER:[TOKEN:[[id number]],FOLLOWUP:[1-3 Days]]

## 2021-11-01 NOTE — ED ADULT NURSE NOTE - NURSING MUSC JOINTS
" I was walking outside when I tripped on the sidewalk ( uneven sidewalk ) and fell and hit my right side face and right forearm " Pt denies LOC but pt presented with pain right side of head , face, jaw and forearm/hand  Pt denies taking blood thinners/yes (describe)

## 2021-11-01 NOTE — ED PROVIDER NOTE - CARE PLAN
1 Principal Discharge DX:	Left wrist fracture  Secondary Diagnosis:	Fracture of zygomatic complex   Principal Discharge DX:	Right wrist fracture  Secondary Diagnosis:	Fracture of zygomatic complex

## 2021-11-01 NOTE — ED ADULT NURSE NOTE - NSICDXPASTMEDICALHX_GEN_ALL_CORE_FT
PAST MEDICAL HISTORY:  Arthritis     Compression fracture of vertebrae     GERD (gastroesophageal reflux disease)     Lung nodule

## 2021-11-01 NOTE — ED ADULT NURSE NOTE - PLAN OF CARE
Call bell/Explanation of exam/test/Fall precautions/Bedside visitors/I and O/NPO/Position of comfort/Security risk/Side rails

## 2021-11-01 NOTE — ED PROVIDER NOTE - WR ORDER ID 2
Please schedule MRI and Back & Spine clinic. I have sent the patient a [facility] message.     Patient will also need labs scheduled prior to her MRI to check her kidney function.  Please schedule this appointment with the patient as well.    Please sign and close this encounter once completed and/or scheduled.   
0
[phone]

## 2021-11-01 NOTE — ED PROVIDER NOTE - SKIN, MLM
Skin normal color for race, warm, dry and intact. No evidence of rash. no abrasions or lacerations superficial abrasion to right side of face and in webspace of right hand

## 2021-11-01 NOTE — ED PROVIDER NOTE - ENMT, MLM
Airway patent, right swelling and tenderness to right jaw and right lateral orbit. full jaw ROM. no crepitus.

## 2021-11-01 NOTE — ED PROVIDER NOTE - WR ORDER STATUS 4
HPI     This is a 69-year-old female presenting to the emergency department for evaluation of left lower back pain. She reports a history of chronic left lower back pain. She follows with pain management for occasional epidural injections. Her last was 3 years ago. Her back pain and been doing very well. She does not take any medication for her regularly. Over the last week she's had a flare up of her back pain. She notes left-sided crampy pain that feels exactly similar to her previous symptoms. The pain does not radiate, she has not been weak in any extremities. No numbness or tingling between the legs. Pain is worse with movement. She's taken an occasional Aleve with some temporary relief but no significant benefit. She called her pain management physician and they will see her septum 14th. She called her primary care doctor he cannot work her in today. no recent traumatic injuries or falls    Past Medical History:   Diagnosis Date    Anxiety     Arthritis     Bell's palsy     in Oct. 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    Unspecified hypothyroidism 5/16/2011       Past Surgical History:   Procedure Laterality Date    CARDIAC SURG PROCEDURE UNLIST      [address] 7/17/18    HX APPENDECTOMY  1961    HX BREAST LUMPECTOMY  1998    benign    HX CATARACT REMOVAL Bilateral 2014    HX CHOLECYSTECTOMY  1976    HX GASTRECTOMY  9/21/15    sleeve    HX HYSTERECTOMY  1987    HX ORTHOPAEDIC Bilateral 1994    heel spurs removed    HX ORTHOPAEDIC  2005    lower back     HX TONSILLECTOMY  1966    HX UROLOGICAL  2008/2009    bladder tack X2    THYROIDECTOMY  1980    partial    TOTAL KNEE ARTHROPLASTY Right 2010    TOTAL KNEE ARTHROPLASTY Left 2011         Family History:   Problem Relation Age of Onset    Cancer Mother      breast    Heart Attack Mother     Cancer Father      throat    Other Father      gastric ulcer    Kidney Disease Father     Malignant Hyperthermia Neg Hx     Pseudocholinesterase Deficiency Neg Hx     Delayed Awakening Neg Hx     Post-op Nausea/Vomiting Neg Hx     Emergence Delirium Neg Hx     Post-op Cognitive Dysfunction Neg Hx        Social History     Social History    Marital status:      Spouse name: N/A    Number of children: N/A    Years of education: N/A     Occupational History    Not on file. Social History Main Topics    Smoking status: Never Smoker    Smokeless tobacco: Never Used    Alcohol use 1.0 oz/week     2 Glasses of wine per week    Drug use: No    Sexual activity: No     Other Topics Concern    Not on file     Social History Narrative         ALLERGIES: Dilaudid [hydromorphone (bulk)] and Sulfa (sulfonamide antibiotics)    Review of Systems   Constitutional: Negative for fever. HENT: Negative. Eyes: Negative. Respiratory: Negative for cough, chest tightness, shortness of breath and wheezing. Cardiovascular: Negative for chest pain. Gastrointestinal: Negative for abdominal distention, abdominal pain, constipation, diarrhea and vomiting. Endocrine: Negative. Genitourinary: Negative for dysuria, flank pain, frequency and urgency. Musculoskeletal: Positive for back pain. Neurological: Negative for dizziness, syncope and headaches. Psychiatric/Behavioral: Negative. All other systems reviewed and are negative.       Vitals:    08/22/18 1358 08/22/18 1626   BP: 159/86 177/90   Pulse: 71 60   Resp: 16 15   Temp: 98.5 °F (36.9 °C)    SpO2: 99% 98%   Weight: 81.6 kg (180 lb)    Height: 5' 4\" (1.626 m) Physical Exam   Constitutional: She is oriented to person, place, and time. She appears well-developed and well-nourished. No distress. HENT:   Head: Normocephalic and atraumatic. Eyes: EOM are normal. Pupils are equal, round, and reactive to light. Cardiovascular: Normal rate and regular rhythm. No murmur heard. Pulmonary/Chest: Effort normal and breath sounds normal. No respiratory distress. She has no wheezes. Abdominal: Soft. Bowel sounds are normal. She exhibits no distension and no mass. There is no tenderness. There is no rebound and no guarding. No cva tenderness bilaterally   Musculoskeletal: She exhibits tenderness ( left lumbar paraspinous musculature). She exhibits no edema or deformity. No stepoffs or deformities,    Neurological: She is alert and oriented to person, place, and time. She has normal reflexes. Sensation intact throughout, including in perineum and in S1 distribution. 5+ strength in all distributions including in bilateral LE. Skin: Skin is warm and dry. No rash noted. She is not diaphoretic. No erythema. Psychiatric: She has a normal mood and affect. Her behavior is normal.   Vitals reviewed. MDM  Number of Diagnoses or Management Options  Chronic left-sided low back pain without sciatica:   Diagnosis management comments: Patient has a reassuring exam, no findings concerning for acute cord compression or cauda equina. Her symptoms feel very similar to her previous episodes of back pain. Given this we'll treat symptomatically, recommend anti-inflammatory treatments. Return to the ER for worsening of symptoms. Patient is competent this plan she understands reasons to return.         ED Course       Procedures Performed Resulted

## 2021-11-01 NOTE — ED PROVIDER NOTE - OBJECTIVE STATEMENT
76 year old female with history of lung nodule, gerd, vertebral compression fracture, and OA presents with right sided facia pain, right forearm pain, and right wrist pain after trip and fall today. patient tripped on uneven surface in parking lot this afternoon. tried to brace her fall and fell onto right forearm and wrist. also hit right side of face. no LOC. does not take any blood thinners. no neck or back pain. no chest or abd pain. pain 7/10. right handed  last ate 2:00 pm, drank some water 4:30  PCP Angelo Sabillon

## 2021-11-02 ENCOUNTER — EMERGENCY (EMERGENCY)
Facility: HOSPITAL | Age: 76
LOS: 1 days | Discharge: HOME | End: 2021-11-02
Attending: EMERGENCY MEDICINE | Admitting: EMERGENCY MEDICINE
Payer: MEDICARE

## 2021-11-02 VITALS
DIASTOLIC BLOOD PRESSURE: 82 MMHG | SYSTOLIC BLOOD PRESSURE: 151 MMHG | OXYGEN SATURATION: 97 % | HEART RATE: 84 BPM | HEIGHT: 60 IN | RESPIRATION RATE: 16 BRPM | WEIGHT: 130.07 LBS | TEMPERATURE: 98 F

## 2021-11-02 VITALS
TEMPERATURE: 98 F | HEART RATE: 98 BPM | DIASTOLIC BLOOD PRESSURE: 85 MMHG | RESPIRATION RATE: 18 BRPM | SYSTOLIC BLOOD PRESSURE: 136 MMHG | OXYGEN SATURATION: 97 %

## 2021-11-02 DIAGNOSIS — Z98.890 OTHER SPECIFIED POSTPROCEDURAL STATES: Chronic | ICD-10-CM

## 2021-11-02 DIAGNOSIS — Z90.49 ACQUIRED ABSENCE OF OTHER SPECIFIED PARTS OF DIGESTIVE TRACT: Chronic | ICD-10-CM

## 2021-11-02 DIAGNOSIS — Z90.722 ACQUIRED ABSENCE OF OVARIES, BILATERAL: Chronic | ICD-10-CM

## 2021-11-02 PROCEDURE — 99283 EMERGENCY DEPT VISIT LOW MDM: CPT

## 2021-11-02 PROCEDURE — 99283 EMERGENCY DEPT VISIT LOW MDM: CPT | Mod: 25

## 2021-11-02 RX ORDER — FAMOTIDINE 10 MG/ML
1 INJECTION INTRAVENOUS
Qty: 0 | Refills: 0 | DISCHARGE

## 2021-11-02 RX ORDER — ACETAMINOPHEN 500 MG
650 TABLET ORAL ONCE
Refills: 0 | Status: COMPLETED | OUTPATIENT
Start: 2021-11-02 | End: 2021-11-02

## 2021-11-02 RX ADMIN — Medication 650 MILLIGRAM(S): at 13:48

## 2021-11-02 NOTE — ED PROVIDER NOTE - CONSTITUTIONAL, MLM
Well appearing, awake, alert, oriented to person, place, time/situation and in no apparent distress. Vital signs normal normal...

## 2021-11-02 NOTE — ED PROVIDER NOTE - OBJECTIVE STATEMENT
77 y/o F with a PMHx of lung nodule, gerd, vertebral compression fracture, and OA presents to the ED c/o tingling and discoloration of her left hand. Pt came to the ED yesterday as she had broken her wrist; a splint was placed on her left wrist. Pt notes that today the fingers on her left hand started to tingle and turned black/blue. 77 y/o F with a PMHx of lung nodule, gerd, vertebral compression fracture, and OA presents to the ED c/o tingling and discoloration of her left hand. Pt came to the ED yesterday as she had broken her wrist; fracture was reduced by Dr. Christianson and a splint was placed on her left wrist. Pt notes that today the fingers on her left hand started to tingle and turned black/blue.  PCP Angelo Rome

## 2021-11-02 NOTE — ED PROVIDER NOTE - NEUROLOGICAL, MLM
Alert and oriented, no focal deficits, no motor or sensory deficits. Alert and oriented, no focal deficits, cap refill < 2 sec. subjective tingling in right fingertips

## 2021-11-02 NOTE — ED ADULT NURSE REASSESSMENT NOTE - NURSING MUSC JOINTS
upper respiratory infection/position upright (90Y)/change of breathing pattern/gurgly voice/pneumonia/oral hygiene/fever/throat clearing/cough
yes (describe)

## 2021-11-02 NOTE — ED PROVIDER NOTE - SKIN, MLM
Skin normal color for race, warm, dry and intact. No evidence of rash. mild ecchymosis to right hand and fingers

## 2021-11-02 NOTE — ED PROVIDER NOTE - MUSCULOSKELETAL, MLM
Spine appears normal, range of motion is not limited. (+) Right hand moderate swelling and bruising, wrist and forearm  splinted from yesterday. splint removed to relieve pressure and reapplied more loosely; pt expressed less discomfort after splint adjusted. (+) Right hand moderate swelling and bruising, wrist and forearm  splinted from yesterday. splint removed to relieve pressure and reapplied more loosely; pt expressed less discomfort after splint adjusted.

## 2021-11-02 NOTE — ED ADULT NURSE REASSESSMENT NOTE - NS ED NURSE REASSESS COMMENT FT1
pain better after released ace and reapplied, and hand feels better   tips pink able to move fingers. pt states wrist feels better  pt re evaluated by md and to be d'c/d  ,d/c   pt advised that percocet is a narcotic and as such has the potential for addiction and also informed should not drive while taking this medication due to its side effects  and pt verbalized understanding

## 2021-11-02 NOTE — ED PROVIDER NOTE - ENMT, MLM
Airway patent, Nasal mucosa clear. Mouth with normal mucosa. Throat has no vesicles, no oropharyngeal exudates and uvula is midline. Airway patent, AT/NC

## 2021-11-02 NOTE — ED PROVIDER NOTE - CARE PROVIDER_API CALL
Nolberto Christianson)  Orthopaedic Surgery  [address] [place] [address], [address]  [place] [address]  Phone: [phone]  Fax: [phone]  Follow Up Time: 1-3 Days

## 2021-11-02 NOTE — ED PROVIDER NOTE - PATIENT PORTAL LINK FT
You can access the FollowMyHealth Patient Portal offered by [facility] by registering at the following website: [email or web address]. By joining [facility]’s FollowMyHealth portal, you will also be able to view your health information using other applications (apps) compatible with our system.

## 2021-11-02 NOTE — ED ADULT NURSE NOTE - NSICDXFAMILYHX_GEN_ALL_CORE_FT
No FAMILY HISTORY:  Father  Still living? Unknown  Family history of valvular heart disease, Age at diagnosis: Age Unknown    Mother  Still living? Unknown  Family history of coronary artery disease, Age at diagnosis: Age Unknown

## 2021-11-02 NOTE — ED ADULT NURSE NOTE - NSIMPLEMENTINTERV_GEN_ALL_ED
Implemented All Fall Risk Interventions:  [place] to call system. Call bell, personal items and telephone within reach. Instruct patient to call for assistance. Room bathroom lighting operational. Non-slip footwear when patient is off stretcher. Physically safe environment: no spills, clutter or unnecessary equipment. Stretcher in lowest position, wheels locked, appropriate side rails in place. Provide visual cue, wrist band, yellow gown, etc. Monitor gait and stability. Monitor for mental status changes and reorient to person, place, and time. Review medications for side effects contributing to fall risk. Reinforce activity limits and safety measures with patient and family.

## 2021-11-02 NOTE — ED ADULT NURSE NOTE - OBJECTIVE STATEMENT
pt seen in ed last night for right wrist fx and today fingers swollen and tips pink capillary refill 2 seconds b/l bruising to base of fingers.  pt able to feel touch no change right tips of fingers pink but slightly cooler than left pt seen in ed last night for right wrist fx and today fingers swollen and tips pink capillary refill 2 seconds b/l bruising to base of fingers.  pt able to feel touch no change right tips of fingers pink but slightly cooler than left splint appears tight

## 2021-11-02 NOTE — ED PROVIDER NOTE - PROGRESS NOTE DETAILS
initial splint unwrapped and rewrapped more loosely. pain in fingers and tingling resolved. will follow up with orthopedics 1-2 days as planned. An opportunity to ask questions was given.  Discussed the importance of prompt, close medical follow-up.  Patient will return with any changes, concerns or persistent / worsening symptoms.  Understanding of all instructions verbalized.

## 2021-11-02 NOTE — ED PROVIDER NOTE - CLINICAL SUMMARY MEDICAL DECISION MAKING FREE TEXT BOX
Wrist fracture with tight splint. Plan: splint adjusted, slang, elevation, f/u with orthopedics as discussed.

## 2021-11-02 NOTE — ED PROVIDER NOTE - NSFOLLOWUPINSTRUCTIONS_ED_ALL_ED_FT
keep splint on until seen by orthopedics  elevate as much as possible  tylenol over the counter for pain, percocet for severe pain (previously prescribed)  follow up with Dr. Christianson       Wrist Fracture in Adults    WHAT YOU NEED TO KNOW:    A wrist fracture is a break in one or more of the bones in your wrist.     Adult Arm Bones         DISCHARGE INSTRUCTIONS:    Return to the emergency department if:   •Your pain gets worse or does not get better after you take pain medicine.      •Your cast or splint breaks, gets wet, or is damaged.      •Your hand or fingers feel numb or cold.      •Your hand or fingers turn white or blue.      •Your splint or cast feels too tight.      •You have more pain or swelling after the cast or splint is put on.      Call your doctor if:   •You have a fever.      •There is a foul smell or blood coming from under the cast.      •You have questions or concerns about your condition or care.      Medicines: You may need any of the following:   •Prescription pain medicine may be given. Ask your healthcare provider how to take this medicine safely. Some prescription pain medicines contain acetaminophen. Do not take other medicines that contain acetaminophen without talking to your healthcare provider. Too much acetaminophen may cause liver damage. Prescription pain medicine may cause constipation. Ask your healthcare provider how to prevent or treat constipation.       •NSAIDs, such as ibuprofen, help decrease swelling, pain, and fever. NSAIDs can cause stomach bleeding or kidney problems in certain people. If you take blood thinner medicine, always ask your healthcare provider if NSAIDs are safe for you. Always read the medicine label and follow directions.      •Acetaminophen decreases pain and fever. It is available without a doctor's order. Ask how much to take and how often to take it. Follow directions. Read the labels of all other medicines you are using to see if they also contain acetaminophen, or ask your doctor or pharmacist. Acetaminophen can cause liver damage if not taken correctly. Do not use more than 4 grams (4,000 milligrams) total of acetaminophen in one day.       •Take your medicine as directed. Contact your healthcare provider if you think your medicine is not helping or if you have side effects. Tell him or her if you are allergic to any medicine. Keep a list of the medicines, vitamins, and herbs you take. Include the amounts, and when and why you take them. Bring the list or the pill bottles to follow-up visits. Carry your medicine list with you in case of an emergency.      Self-care:   •Rest as much as possible. Do not play contact sports until the healthcare provider says it is okay.      •Apply ice on your wrist for 15 to 20 minutes every hour or as directed. Use an ice pack, or put crushed ice in a plastic bag. Cover it with a towel before you place it on your skin. Ice helps prevent tissue damage and decreases swelling and pain.      •Elevate your wrist above the level of your heart as often as possible. This will help decrease swelling and pain. Prop your wrist on pillows or blankets to keep it elevated comfortably.             Cast or splint care:   •You may take a bath or shower as directed. Do not let your cast or splint get wet. Before bathing, cover the cast or splint with 2 plastic trash bags. Tape the bags to your skin above the cast or splint to seal out the water. Keep your arm out of the water in case the bag breaks. If a plaster cast gets wet and soft, call your healthcare provider.      •Check the skin around the cast or splint every day. You may put lotion on any red or sore areas.      •Do not push down or lean on the cast or brace because it may break.      •Do not scratch the skin under the cast by putting a sharp or pointed object inside the cast.      Go to physical therapy as directed: You may need physical therapy after your wrist heals and the cast is removed. A physical therapist can teach you exercises to help improve movement and strength and to decrease pain.    Follow up with your doctor or bone specialist as directed: You may need to return to have your cast removed. You may also need an x-ray to check how well the bone has healed. Write down your questions so you remember to ask them during your visits.

## 2021-11-08 ENCOUNTER — APPOINTMENT (OUTPATIENT)
Dept: FAMILY MEDICINE | Facility: CLINIC | Age: 76
End: 2021-11-08
Payer: MEDICARE

## 2021-11-08 VITALS
HEART RATE: 93 BPM | OXYGEN SATURATION: 95 % | BODY MASS INDEX: 25.01 KG/M2 | DIASTOLIC BLOOD PRESSURE: 66 MMHG | WEIGHT: 127.38 LBS | TEMPERATURE: 98.4 F | SYSTOLIC BLOOD PRESSURE: 110 MMHG | HEIGHT: 60 IN | RESPIRATION RATE: 14 BRPM

## 2021-11-08 PROCEDURE — 99214 OFFICE O/P EST MOD 30 MIN: CPT

## 2021-11-09 NOTE — HISTORY OF PRESENT ILLNESS
[No Pertinent Cardiac History] : no history of aortic stenosis, atrial fibrillation, coronary artery disease, recent myocardial infarction, or implantable device/pacemaker [No Pertinent Pulmonary History] : no history of asthma, COPD, sleep apnea, or smoking [No Adverse Anesthesia Reaction] : no adverse anesthesia reaction in self or family member [Moderate (4-6 METs)] : Moderate (4-6 METs) [Chronic Anticoagulation] : no chronic anticoagulation [Chronic Kidney Disease] : no chronic kidney disease [Diabetes] : no diabetes [FreeTextEntry1] : Arm Fracture Right [FreeTextEntry2] : 11/10/21 [FreeTextEntry3] : Ridge Arceo [FreeTextEntry4] : Patient here for preoperative evaluation subsequent to a slip and fall on the pavement approximately a week ago at which time she sustained a fracture to her right arm and also some facial fractures she was evaluated in the emergency room placed in a cast had a CAT scan of the head and advised to follow-up with orthopedics who has now recommended a surgical fixation of this fracture the patient's past medical history includes GERD and osteoporosis surgically she has had an appendectomy hysterectomy cataract surgery that were all tolerated no history of DVT or PE.  Patient is currently undergoing evaluation for pulmonary nodules [FreeTextEntry6] : Patient has mild shortness of breath if she walks quickly or climbs stairs [FreeTextEntry7] : Normal EKG in 2018

## 2021-11-09 NOTE — PHYSICAL EXAM
[No Acute Distress] : no acute distress [Well Nourished] : well nourished [Well Developed] : well developed [Well-Appearing] : well-appearing [Normal Sclera/Conjunctiva] : normal sclera/conjunctiva [PERRL] : pupils equal round and reactive to light [Normal Outer Ear/Nose] : the outer ears and nose were normal in appearance [Normal Oropharynx] : the oropharynx was normal [No JVD] : no jugular venous distention [No Lymphadenopathy] : no lymphadenopathy [Thyroid Normal, No Nodules] : the thyroid was normal and there were no nodules present [No Respiratory Distress] : no respiratory distress  [No Accessory Muscle Use] : no accessory muscle use [Clear to Auscultation] : lungs were clear to auscultation bilaterally [Normal Percussion] : the chest was normal to percussion [Normal Rate] : normal rate  [Regular Rhythm] : with a regular rhythm [No Murmur] : no murmur heard [No Edema] : there was no peripheral edema [Soft] : abdomen soft [Non Tender] : non-tender [Non-distended] : non-distended [No Masses] : no abdominal mass palpated [No HSM] : no HSM [Normal Bowel Sounds] : normal bowel sounds [Normal Supraclavicular Nodes] : no supraclavicular lymphadenopathy [Normal Posterior Cervical Nodes] : no posterior cervical lymphadenopathy [Normal Anterior Cervical Nodes] : no anterior cervical lymphadenopathy [No CVA Tenderness] : no CVA  tenderness [No Spinal Tenderness] : no spinal tenderness [Coordination Grossly Intact] : coordination grossly intact [No Focal Deficits] : no focal deficits [Normal Gait] : normal gait [Speech Grossly Normal] : speech grossly normal [Alert and Oriented x3] : oriented to person, place, and time [Normal Mood] : the mood was normal [de-identified] : Right arm in the sling [de-identified] : Right arm in sling right hand discolored

## 2021-11-09 NOTE — RESULTS/DATA
[] : results reviewed [de-identified] : Normal [de-identified] : Normal [de-identified] : Normal

## 2021-11-09 NOTE — ASSESSMENT
[High Risk Surgery - Intraperitoneal, Intrathoracic or Supringuinal Vascular Procedures] : High Risk Surgery - Intraperitoneal, Intrathoracic or Supringuinal Vascular Procedures - No (0) [Ischemic Heart Disease] : Ischemic Heart Disease - No (0) [Congestive Heart Failure] : Congestive Heart Failure - No (0) [Prior Cerebrovascular Accident or TIA] : Prior Cerebrovascular Accident or TIA - No (0) [Creatinine >= 2mg/dL (1 Point)] : Creatinine >= 2mg/dL - No (0) [Insulin-dependent Diabetic (1 Point)] : Insulin-dependent Diabetic - No (0) [0] : 0 , RCRI Class: I, Risk of Post-Op Cardiac Complications: 3.9%, 95% CI for Risk Estimate: 2.8% - 5.4% [Patient Optimized for Surgery] : Patient optimized for surgery [No Further Testing Recommended] : no further testing recommended [As per surgery] : as per surgery [FreeTextEntry4] : This is a low risk surgery the patient is a low risk patient from a cardiopulmonary point of view .  Proceed with surgery

## 2021-11-09 NOTE — REVIEW OF SYSTEMS
[Dyspnea on Exertion] : dyspnea on exertion [Joint Pain] : joint pain [Joint Stiffness] : joint stiffness [Dizziness] : dizziness [Fever] : no fever [Chills] : no chills [Fatigue] : no fatigue [Hot Flashes] : no hot flashes [Night Sweats] : no night sweats [Vision Problems] : no vision problems [Sore Throat] : no sore throat [Chest Pain] : no chest pain [Palpitations] : no palpitations [Lower Ext Edema] : no lower extremity edema [Orthopnea] : no orthopnea [Paroxysmal Nocturnal Dyspnea] : no paroxysmal nocturnal dyspnea [Shortness Of Breath] : no shortness of breath [Cough] : no cough [Abdominal Pain] : no abdominal pain [Nausea] : no nausea [Constipation] : no constipation [Diarrhea] : diarrhea [Vomiting] : no vomiting [Heartburn] : no heartburn [Melena] : no melena [Dysuria] : no dysuria [Incontinence] : no incontinence [Nocturia] : no nocturia [Hematuria] : no hematuria [Frequency] : no frequency [Headache] : no headache [Fainting] : no fainting [Confusion] : no confusion [Memory Loss] : no memory loss [Unsteady Walking] : no ataxia [Easy Bleeding] : no easy bleeding [Easy Bruising] : no easy bruising [FreeTextEntry6] : Mild dyspnea when climbing stairs or walking quickly [de-identified] : Some mild dizziness since fall

## 2021-12-12 DIAGNOSIS — Z01.818 ENCOUNTER FOR OTHER PREPROCEDURAL EXAMINATION: ICD-10-CM

## 2021-12-13 ENCOUNTER — APPOINTMENT (OUTPATIENT)
Dept: DISASTER EMERGENCY | Facility: CLINIC | Age: 76
End: 2021-12-13

## 2021-12-17 ENCOUNTER — APPOINTMENT (OUTPATIENT)
Dept: PULMONOLOGY | Facility: CLINIC | Age: 76
End: 2021-12-17

## 2022-04-06 ENCOUNTER — APPOINTMENT (OUTPATIENT)
Dept: NUCLEAR MEDICINE | Facility: CLINIC | Age: 77
End: 2022-04-06
Payer: MEDICARE

## 2022-04-06 ENCOUNTER — OUTPATIENT (OUTPATIENT)
Dept: OUTPATIENT SERVICES | Facility: HOSPITAL | Age: 77
LOS: 1 days | End: 2022-04-06
Payer: MEDICARE

## 2022-04-06 DIAGNOSIS — Z98.890 OTHER SPECIFIED POSTPROCEDURAL STATES: Chronic | ICD-10-CM

## 2022-04-06 DIAGNOSIS — Z90.722 ACQUIRED ABSENCE OF OVARIES, BILATERAL: Chronic | ICD-10-CM

## 2022-04-06 DIAGNOSIS — Z00.8 ENCOUNTER FOR OTHER GENERAL EXAMINATION: ICD-10-CM

## 2022-04-06 DIAGNOSIS — Z90.49 ACQUIRED ABSENCE OF OTHER SPECIFIED PARTS OF DIGESTIVE TRACT: Chronic | ICD-10-CM

## 2022-04-06 PROCEDURE — 78815 PET IMAGE W/CT SKULL-THIGH: CPT | Mod: MH

## 2022-04-06 PROCEDURE — A9552: CPT

## 2022-04-06 PROCEDURE — 78815 PET IMAGE W/CT SKULL-THIGH: CPT | Mod: 26,PI,MH

## 2022-04-27 ENCOUNTER — APPOINTMENT (OUTPATIENT)
Dept: THORACIC SURGERY | Facility: CLINIC | Age: 77
End: 2022-04-27
Payer: MEDICARE

## 2022-04-27 PROCEDURE — 99214 OFFICE O/P EST MOD 30 MIN: CPT | Mod: 95

## 2022-04-29 NOTE — HISTORY OF PRESENT ILLNESS
[Home] : at home, [unfilled] , at the time of the visit. [Medical Office: ([facility])___] : at the medical office located in  [Family Member] : family member [Verbal consent obtained from patient] : the patient, [unfilled] [FreeTextEntry1] : Ms. ALCIRA MENDOZA, 77 year old female, former smoker who presented for initial evaluation in March 2017 for lung nodule detected several years prior as part of a lung screening program. She has been monitored with serial CT scans since that time. 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 Jarquin), workup revealed questionable TIA, patient recently stopped taking her ASA + Lipitor. \par \par Of note, due to her  declining health she elected to continue with close surveillance as opposed to surgical resection. \par \par CT Chest on 8/7/21:\par - 1.6 x 0.9 cm right middle lobe part solid nodule is minimally enlarged from the prior study and more enlarged from older studies.\par \par PET/CT  on 4/11/22:\par - Unchanged, mildly FDG avid right level Ib lymph node, 1.0 x 0.6 cm (image 43), SUV 4.5; previously 0.9 x 0.6 cm, SUV 4.0. \par - LUNGS: Mildly increased size and FDG avidity of groundglass and solid right middle lobe pulmonary nodule, 1.7 x 0.9 cm (image 106), SUV 2.4; previously 1.0 x 0.8 cm, SUV 1.2. \par - Non-FDG avid biapical pleuroparenchymal scarring, again noted.\par - SOFT TISSUES: Mildly FDG avid small soft tissue focus in posterior aspect of right proximal humerus, likely enthesopathy.\par \par Of note, last visit (telehealth) 8/18/21 CT reviewed with pt, RML lung nodule has grown in size and suspicious for an indolent cancer. I recommended to surgically remove it but patient was not able to make a decision then.\par \par Patient last seen in office 9/2021. She booked Right VATS RA RML Wedge, possible RMLobectomy (PFT and Medical [facility]) for January 2022 but then cancelled it due to wrist surgery she was having done. She was suppose to return to office in January with repeat CT Chest but was lost to follow up. Today she returns to discuss new PET/CT findings ordered by her pulmonologist Dr. Berumen. She endorsed she had her wrist surgery in November 2021 and that she is still in a great deal of pain, her  passed away couple of months ago, and that her daughter is expecting another child. Patient denies worsening shortness of breath, cough, chest pain, fever, chills, unintentional weight loss, hemoptysis. \par \par \par \par

## 2022-04-29 NOTE — CONSULT LETTER
[FreeTextEntry2] : Dr. Manuel Sheikh (PCP/referring) [FreeTextEntry3] : Adriano Lats MD, FACS \par Vice Chairperson, Thoracic Surgery, [facility] Department of Cardiovascular & Thoracic Surgery \par , [facility] [employer] at [facility]

## 2022-04-29 NOTE — ASSESSMENT
[FreeTextEntry1] : Ms. ALCIRA MENDOZA, 77 year old female, former smoker who presented for initial evaluation in March 2017 for lung nodule detected several years prior as part of a lung screening program. \par \par \par RML nodule is slow growing and suspicious for malignancy. I discussed my recommendation for surgery at length again with patient and her daughter via telehealth. The patients  passed away in the last couple of months, her daughter is expecting another child, and she is still in a great deal of pain s/p wrist surgery. She endorsed that she is very overwhelmed and became so emotional that her daughter had to take over the call. She does not want to undergo surgery in the mental state that she is currently in. She opted to return to office in September with new CT Chest. She and her daughter understand the nodule is likely to continue to increase in size as it has been over the last few years. \par \par I personally performed the services described in the documentation, reviewed the documentation recorded by the scribe in my presence and it accurately and completely records my words and actions.\par \par I, YAMILETH Rios, am scribing for and in the presence of CLAIRE Painter, the following sections HISTORY OF PRESENT ILLNESS, PAST MEDICAL/FAMILY/SOCIAL HISTORY; REVIEW OF SYSTEMS; VITAL SIGNS; PHYSICAL EXAM; DISPOSITION.\par

## 2022-05-20 ENCOUNTER — APPOINTMENT (OUTPATIENT)
Dept: ORTHOPEDIC SURGERY | Facility: CLINIC | Age: 77
End: 2022-05-20
Payer: MEDICARE

## 2022-05-20 DIAGNOSIS — M65.341 TRIGGER FINGER, RIGHT RING FINGER: ICD-10-CM

## 2022-05-20 DIAGNOSIS — S52.571D OTHER INTRAARTICULAR FRACTURE OF LOWER END OF RIGHT RADIUS, SUBSEQUENT ENCOUNTER FOR CLOSED FRACTURE WITH ROUTINE HEALING: ICD-10-CM

## 2022-05-20 PROCEDURE — 20550 NJX 1 TENDON SHEATH/LIGAMENT: CPT

## 2022-05-20 PROCEDURE — 99213 OFFICE O/P EST LOW 20 MIN: CPT | Mod: 24,25

## 2022-05-20 PROCEDURE — J3490M: CUSTOM

## 2022-05-23 PROBLEM — S52.571D OTHER CLOSED INTRA-ARTICULAR FRACTURE OF DISTAL END OF RIGHT RADIUS WITH ROUTINE HEALING, SUBSEQUENT ENCOUNTER: Status: ACTIVE | Noted: 2022-05-23

## 2022-05-23 PROBLEM — M65.341 TRIGGER RING FINGER OF RIGHT HAND: Status: ACTIVE | Noted: 2022-05-23

## 2022-05-23 NOTE — HISTORY OF PRESENT ILLNESS
[Right Arm] : right arm [5] : 5 [4] : 4 [Burning] : burning [Shooting] : shooting [Stabbing] : stabbing [Intermittent] : intermittent [Exercising] : exercising [Retired] : Work status: retired [de-identified] : ORIF R distal radius\par Doing well\par Now R RF trigger  [] : Post Surgical Visit: no [FreeTextEntry1] : right wrist  [de-identified] : xrays mris  [de-identified] : None

## 2022-05-23 NOTE — PHYSICAL EXAM
[de-identified] : R hand: \par Mild swelling \par Tender 4th A1 pulley \par Decreased ring ROM \par +ring triggering\par Good wrist ROM\par Nontender

## 2022-07-07 NOTE — ED ADULT NURSE NOTE - PAIN RATING/NUMBER SCALE (0-10): REST
4 pt here for public intox for AMS 2/2 drinking at work, euglycemic, monitored in the ED with improved mental status, AFVSS, currently ambulatory with steady gait, tolerating PO without N/V, clear speech, without additional medical complaints noted.  Repeat exam and neuro/cranial nerve exams wnl.  No evidence of head/neck trauma. Pt feels much better and safe for discharge. Counseling provided. Medically stable for d/c.

## 2022-09-13 ENCOUNTER — APPOINTMENT (OUTPATIENT)
Dept: CT IMAGING | Facility: CLINIC | Age: 77
End: 2022-09-13

## 2022-09-13 ENCOUNTER — OUTPATIENT (OUTPATIENT)
Dept: OUTPATIENT SERVICES | Facility: HOSPITAL | Age: 77
LOS: 1 days | End: 2022-09-13
Payer: MEDICARE

## 2022-09-13 DIAGNOSIS — Z90.722 ACQUIRED ABSENCE OF OVARIES, BILATERAL: Chronic | ICD-10-CM

## 2022-09-13 DIAGNOSIS — R91.8 OTHER NONSPECIFIC ABNORMAL FINDING OF LUNG FIELD: ICD-10-CM

## 2022-09-13 DIAGNOSIS — Z98.890 OTHER SPECIFIED POSTPROCEDURAL STATES: Chronic | ICD-10-CM

## 2022-09-13 DIAGNOSIS — Z90.49 ACQUIRED ABSENCE OF OTHER SPECIFIED PARTS OF DIGESTIVE TRACT: Chronic | ICD-10-CM

## 2022-09-13 PROCEDURE — 71250 CT THORAX DX C-: CPT | Mod: 26,MH

## 2022-09-13 PROCEDURE — 71250 CT THORAX DX C-: CPT

## 2022-09-20 NOTE — REASON FOR VISIT
[Medical Office: ([facility])___] : at the medical office located in  [Verbal consent obtained from patient] : the patient, [unfilled] [Follow-Up: _____] : a [unfilled] follow-up visit

## 2022-09-21 ENCOUNTER — APPOINTMENT (OUTPATIENT)
Dept: THORACIC SURGERY | Facility: CLINIC | Age: 77
End: 2022-09-21

## 2022-09-21 PROCEDURE — 99443: CPT | Mod: 95

## 2022-09-25 NOTE — CONSULT LETTER
[Dear  ___] : Dear  [unfilled], [Courtesy Letter:] : I had the pleasure of seeing your patient, [unfilled], in my office today. [Please see my note below.] : Please see my note below. [Sincerely,] : Sincerely, [FreeTextEntry2] : Dr. Manuel Sheikh (PCP/referring) [FreeTextEntry3] : Adriano Last MD, FACS \par Vice Chairperson, Thoracic Surgery, [facility] Department of Cardiovascular & Thoracic Surgery \par , [facility] [employer] at [facility]

## 2022-09-25 NOTE — END OF VISIT
[Time Spent: ___ minutes] : I have spent [unfilled] minutes of time on the encounter. [FreeTextEntry3] : Written by Jose Mcknight NP, acting as a scribe for Dr. Last\par “The documentation recorded by the scribe accurately reflects the service I personally performed and the decisions made by me.” Signature Adriano Last MD.

## 2022-09-25 NOTE — DATA REVIEWED
[FreeTextEntry1] : CT Chest on 9/13/22:\par - Clustered micronodular opacities (likely representing mucoid impacted airways) and bronchiolectasis primarily involving the anterior right upper lobe, similar to 8/7/2021 CT chest. \par - RML mixed solid and ground-glass nodular opacity measures 1.6 x 0.7 cm cm (coronal series image 37). This lesion is not significantly changed compared to 8/7/2021 when remeasured in a similar manner and location.\par \par PET/CT  on 4/11/22:\par - Unchanged, mildly FDG avid right level Ib lymph node, 1.0 x 0.6 cm (image 43), SUV 4.5; previously 0.9 x 0.6 cm, SUV 4.0. \par - LUNGS: Mildly increased size and FDG avidity of groundglass and solid right middle lobe pulmonary nodule, 1.7 x 0.9 cm (image 106), SUV 2.4; previously 1.0 x 0.8 cm, SUV 1.2. \par - Non-FDG avid biapical pleuroparenchymal scarring, again noted.\par - Mildly FDG avid small soft tissue focus in posterior aspect of right proximal humerus, likely enthesopathy.\par \par

## 2022-09-25 NOTE — HISTORY OF PRESENT ILLNESS
[FreeTextEntry1] : Ms. ALCIRA MENDOZA, 77 year old female, former smoker who presented for initial evaluation in March 2017 for lung nodule detected several years prior as part of a lung screening program. She has been monitored with serial CT scans since that time. \par \par Of note, she reports episode of transient right eye blindness in February 2020, evaluated at [facility] + outpatient by neurology (Dr. Jarquin), workup revealed questionable TIA.  \par \par CT Chest on 8/7/21:\par - 1.6 x 0.9 cm right middle lobe part solid nodule is minimally enlarged from the prior study and more enlarged from older studies.\par \par - 8/18/21:CT reviewed with pt, RML lung nodule has grown in size and suspicious for an indolent cancer. I recommended to surgically remove it but patient was not able to make a decision then.\par \par -  9/2021: booked for Right VATS RA RML Wedge, possible RMLobectomy for January 2022. Subsequently cancelled it due to wrist surgery she was having done.\par \par - 4/2022: Return to discuss new PET/CT. Findings ordered by her pulmonologist Dr. Berumen. Patient opting for surveillance versus surgery. \par \par Here today with follow up scan. \par

## 2022-09-25 NOTE — ASSESSMENT
[FreeTextEntry1] : Ms. ALCIRA MENDOZA, 77 year old female, former smoker who presented for initial evaluation in March 2017 for lung nodule detected several years prior as part of a lung screening program. \par \par I reviewed the imaging, medical records and reports with patient and discussed the case.  Reviewed CT chest from 9/13/2022 and compared to prior CT's. RML nodule has slowly grown since 2019.  Most recent CT appears more solid and more suspicious. I discussed the risks associated with surgery and risks of no intervention. Recommend Right VATS, robotic assisted RML wedge resection, possible lobectomy to be done at [facility]. \par \par Plan\par - Right VATS, robotic assisted RML wedge resection, possible lobectomy to be done at [facility]. \par - Patient agrees but wishes to wait until January 2023 to proceed\par - Call with any questions\par - Advised pt to set up appointment with family to discuss in person prior to surgery\par

## 2022-11-03 ENCOUNTER — APPOINTMENT (OUTPATIENT)
Dept: FAMILY MEDICINE | Facility: CLINIC | Age: 77
End: 2022-11-03

## 2022-11-03 VITALS
BODY MASS INDEX: 24.94 KG/M2 | OXYGEN SATURATION: 95 % | WEIGHT: 127 LBS | HEART RATE: 158 BPM | TEMPERATURE: 98.4 F | DIASTOLIC BLOOD PRESSURE: 68 MMHG | RESPIRATION RATE: 16 BRPM | HEIGHT: 60 IN | SYSTOLIC BLOOD PRESSURE: 120 MMHG

## 2022-11-03 DIAGNOSIS — H53.129 TRANSIENT VISUAL LOSS, UNSPECIFIED EYE: ICD-10-CM

## 2022-11-03 PROCEDURE — 99214 OFFICE O/P EST MOD 30 MIN: CPT

## 2022-11-03 NOTE — ASSESSMENT
[FreeTextEntry1] : Patient seems to be stable she is uncertain as to how aggressive she would like to be in evaluating her pulmonary nodule we had a long conversation about the pros and cons of this we will send her for routine laboratory work and also repeat sonogram of her carotid arteries follow-up with this in about a month and after the lab and sonograms are available to us she has been encouraged to continue to see her pulmonologist and follow their advice time of visit 35 minutes

## 2022-11-03 NOTE — HISTORY OF PRESENT ILLNESS
[FreeTextEntry1] : Patient seen here in follow-up on a lung nodule [de-identified] : Patient here in follow-up on a lung nodule she has recovered from her prior wrist surgery has been following with pulmonary because of a lung nodule which on PET scan seems to be slightly enlarged she was scheduled for surgery but then canceled because of the death of her  and her other issues with her wrist she still is uncertain as to whether or not she wants to approach this surgically also of note is that she had an episode of transient monocular blindness several years ago and has not been followed up on this you have systems is unremarkable otherwise

## 2022-11-03 NOTE — REVIEW OF SYSTEMS
[Dyspnea on Exertion] : dyspnea on exertion [Negative] : Neurological [FreeTextEntry6] : Some mild dyspnea on exertion

## 2022-11-17 ENCOUNTER — OUTPATIENT (OUTPATIENT)
Dept: OUTPATIENT SERVICES | Facility: HOSPITAL | Age: 77
LOS: 1 days | End: 2022-11-17
Payer: MEDICARE

## 2022-11-17 ENCOUNTER — APPOINTMENT (OUTPATIENT)
Dept: ULTRASOUND IMAGING | Facility: CLINIC | Age: 77
End: 2022-11-17

## 2022-11-17 DIAGNOSIS — Z90.722 ACQUIRED ABSENCE OF OVARIES, BILATERAL: Chronic | ICD-10-CM

## 2022-11-17 DIAGNOSIS — H53.129 TRANSIENT VISUAL LOSS, UNSPECIFIED EYE: ICD-10-CM

## 2022-11-17 DIAGNOSIS — Z98.890 OTHER SPECIFIED POSTPROCEDURAL STATES: Chronic | ICD-10-CM

## 2022-11-17 DIAGNOSIS — Z90.49 ACQUIRED ABSENCE OF OTHER SPECIFIED PARTS OF DIGESTIVE TRACT: Chronic | ICD-10-CM

## 2022-11-17 LAB
BASOPHILS # BLD AUTO: 0.06 K/UL
BASOPHILS NFR BLD AUTO: 1.2 %
EOSINOPHIL # BLD AUTO: 0.13 K/UL
EOSINOPHIL NFR BLD AUTO: 2.6 %
HCT VFR BLD CALC: 43.8 %
HGB BLD-MCNC: 13.9 G/DL
IMM GRANULOCYTES NFR BLD AUTO: 0.4 %
LYMPHOCYTES # BLD AUTO: 2.22 K/UL
LYMPHOCYTES NFR BLD AUTO: 44.8 %
MAN DIFF?: NORMAL
MCHC RBC-ENTMCNC: 30.8 PG
MCHC RBC-ENTMCNC: 31.7 GM/DL
MCV RBC AUTO: 96.9 FL
MONOCYTES # BLD AUTO: 0.32 K/UL
MONOCYTES NFR BLD AUTO: 6.5 %
NEUTROPHILS # BLD AUTO: 2.2 K/UL
NEUTROPHILS NFR BLD AUTO: 44.5 %
PLATELET # BLD AUTO: 228 K/UL
RBC # BLD: 4.52 M/UL
RBC # FLD: 12.5 %
WBC # FLD AUTO: 4.95 K/UL

## 2022-11-17 PROCEDURE — 93880 EXTRACRANIAL BILAT STUDY: CPT

## 2022-11-17 PROCEDURE — 93880 EXTRACRANIAL BILAT STUDY: CPT | Mod: 26

## 2022-11-18 LAB
ALBUMIN SERPL ELPH-MCNC: 4.8 G/DL
ALP BLD-CCNC: 68 U/L
ALT SERPL-CCNC: 13 U/L
ANION GAP SERPL CALC-SCNC: 13 MMOL/L
APPEARANCE: CLEAR
AST SERPL-CCNC: 17 U/L
BILIRUB SERPL-MCNC: 0.5 MG/DL
BILIRUBIN URINE: NEGATIVE
BLOOD URINE: NEGATIVE
BUN SERPL-MCNC: 11 MG/DL
CALCIUM SERPL-MCNC: 9.4 MG/DL
CHLORIDE SERPL-SCNC: 101 MMOL/L
CHOLEST SERPL-MCNC: 195 MG/DL
CO2 SERPL-SCNC: 24 MMOL/L
COLOR: COLORLESS
CREAT SERPL-MCNC: 0.74 MG/DL
EGFR: 83 ML/MIN/1.73M2
ERYTHROCYTE [SEDIMENTATION RATE] IN BLOOD BY WESTERGREN METHOD: 17 MM/HR
ESTIMATED AVERAGE GLUCOSE: 111 MG/DL
GLUCOSE QUALITATIVE U: NEGATIVE
GLUCOSE SERPL-MCNC: 87 MG/DL
HBA1C MFR BLD HPLC: 5.5 %
HDLC SERPL-MCNC: 51 MG/DL
KETONES URINE: NEGATIVE
LDLC SERPL CALC-MCNC: 123 MG/DL
LEUKOCYTE ESTERASE URINE: NEGATIVE
NITRITE URINE: NEGATIVE
NONHDLC SERPL-MCNC: 144 MG/DL
PH URINE: 7
POTASSIUM SERPL-SCNC: 4.1 MMOL/L
PROT SERPL-MCNC: 7.4 G/DL
PROTEIN URINE: NEGATIVE
SODIUM SERPL-SCNC: 138 MMOL/L
SPECIFIC GRAVITY URINE: 1.01
TRIGL SERPL-MCNC: 106 MG/DL
UROBILINOGEN URINE: NORMAL

## 2022-12-20 ENCOUNTER — NON-APPOINTMENT (OUTPATIENT)
Age: 77
End: 2022-12-20

## 2022-12-20 NOTE — ED ADULT TRIAGE NOTE - MEANS OF ARRIVAL
Cardiology Clinic Note: Inessa Borjas CNP     Referred by:   No Pcp  No address on file     Primary care physician: No Pcp     Date of Service: 2022       Consultation for Ray Reyes  : 1993      Subjective:   Ray Reyes is a 29 year old male here for a follow up visit.  PMH includes congenital heart disease (unclear ASD closure), abnormal EKG, mild pulmonic valve stenosis, mild-moderate pulmonary valve regurgitation.     Last saw Dr. Correa 22.  Underwent GXT on 22 walking 11:39 total 13.4 METS stopped d/t fatigue and negative for ischemia by EKG criteria.  Last night had panic attack for 2 minutes and unsure what set it off.  Has anxiety at night trying to get to sleep.  Occasional KAY with exercising with chest pain during evening hours.  Nonexertional and thinks is mental.  Palpitations when feeling anxiety.  Works at [employer] working in [employer] 8+ hours daily.  Rides bike to work.  Pushes vegetables.  Some fried foods from work.        Review of Systems   Constitutional: Negative for fatigue and unexpected weight change.   HENT: Negative for nosebleeds.    Respiratory: Negative for cough and shortness of breath.    Cardiovascular: Negative for chest pain, palpitations and leg swelling.   Gastrointestinal: Negative for abdominal distention, abdominal pain and blood in stool.   Endocrine: Negative for polyuria.   Genitourinary: Negative for hematuria.   Musculoskeletal: Negative for myalgias.   Skin: Negative for pallor.   Neurological: Negative for syncope and light-headedness.   Hematological: Does not bruise/bleed easily.   Otherwise complete ROS is negative.      Past Medical History:   Diagnosis Date   • Mixed hyperlipidemia        @     Family History   Problem Relation Age of Onset   • Osteoarthritis Mother    • Heart disease Father    • Alcohol Abuse Father    • Bipolar disorder Father    • Diabetes Father    • Alcohol Abuse Sister    • Alcohol Abuse Brother    • Alcohol Abuse  Brother         Social History     Tobacco Use   • Smoking status: Never   • Smokeless tobacco: Never   Vaping Use   • Vaping Use: never used   Substance Use Topics   • Alcohol use: Not Currently   • Drug use: Not Currently        ALLERGIES:  No Known Allergies  No current outpatient medications on file.     No current facility-administered medications for this visit.        Objective:     Physical Exam:   Visit Vitals  /57 (BP Location: LUE - Left upper extremity, Patient Position: Sitting, Cuff Size: Regular)   Pulse 68   Ht 5' 8\" (1.727 m)   Wt 79.3 kg (174 lb 13.2 oz)   SpO2 99%   BMI 26.58 kg/m²     Wt Readings from Last 4 Encounters:   12/20/22 79.3 kg (174 lb 13.2 oz)   09/12/22 77 kg (169 lb 12.1 oz)   07/27/22 74.7 kg (164 lb 12.7 oz)   05/17/22 78.7 kg (173 lb 6.3 oz)     General: NAD  Eyes:  No arcus; No xanthelasma  Neck: No JVD sitting  Respiratory: Symmetric chest expansion, No crackles, wheezes or dullness  Cardiovascular:  Rhythm regular; Normal S1, S2 split normal; diastolic murmur; No carotid bruits  GI: Not distended, non-tender, BS active  MSK: Muscle mass as expected for age   Extremities: No edema; no varicose veins; no open sores  Neurologic:  Alert, no tremor  Mood: Euthymic  Skin: Intact         Labs:    Cholesterol (mg/dL)   Date Value   09/09/2022 156     HDL (mg/dL)   Date Value   09/09/2022 43     Cholesterol/ HDL Ratio (no units)   Date Value   09/09/2022 3.6     Triglycerides (mg/dL)   Date Value   09/09/2022 64     LDL (mg/dL)   Date Value   09/09/2022 100        Hemoglobin A1C (%)   Date Value   09/09/2022 4.7        WBC (K/mcL)   Date Value   04/24/2022 6.3     RBC (mil/mcL)   Date Value   04/24/2022 5.98 (H)     HCT (%)   Date Value   04/24/2022 48.6     HGB (g/dL)   Date Value   04/24/2022 17.5 (H)     PLT (K/mcL)   Date Value   04/24/2022 204        Sodium (mmol/L)   Date Value   09/09/2022 140     Potassium (mmol/L)   Date Value   09/09/2022 4.3     Chloride (mmol/L)    Date Value   09/09/2022 105     Glucose (mg/dL)   Date Value   09/09/2022 99     Calcium (mg/dL)   Date Value   09/09/2022 9.1     Carbon Dioxide (mmol/L)   Date Value   09/09/2022 27     BUN (mg/dL)   Date Value   09/09/2022 11     Creatinine (mg/dL)   Date Value   09/09/2022 0.76        GOT/AST (Units/L)   Date Value   04/24/2022 15     GPT/ALT (Units/L)   Date Value   04/24/2022 12     No results found for: GGTP  Alkaline Phosphatase (Units/L)   Date Value   04/24/2022 106     Bilirubin, Total (mg/dL)   Date Value   04/24/2022 0.7     No results found for: MAGNESIUM    TSH (mcUnits/mL)   Date Value   10/16/2017 1.469        No results found for: BNP    No results found for: DIG     The ASCVD Risk score (Simone DK, et al., 2019) failed to calculate for the following reasons:    The 2019 ASCVD risk score is only valid for ages 40 to 79            Imaging:  Results for orders placed or performed during the hospital encounter of 04/24/22   Electrocardiogram 12-Lead   Result Value Ref Range    Ventricular Rate EKG/Min (BPM) 86     Atrial Rate (BPM) 86     RI-Interval (MSEC) 132     QRS-Interval (MSEC) 96     QT-Interval (MSEC) 360     QTc 430     P Axis (Degrees) 52     R Axis (Degrees) 103     T Axis (Degrees) 40     REPORT TEXT       Normal sinus rhythm  Rightward axis  Incomplete right bundle branch block  Septal infarct  (cited on or before  24-APR-2022)  Abnormal ECG  When compared with ECG of  24-APR-2022 19:40,  No significant change was found  Confirmed by BIN CRAFT, [facility] ([id number]) on 4/26/2022 12:29:34 AM         STRESS TEST  Narrative: 1993  Indication: Chest Pain, Pulmonary Valve Disease, ABN EKG  he  Ordering Provider: Brock Correa MD    Procedure Performed:  Exercise Stress Test    Discussion:  The patient's resting electrocardiogram utilizing modified leads   demonstrated Sinus Rhythm with borderline IVCD.  There were no other   abnormalities.    The resting heart rate was 81 beats per minute with  a resting blood   pressure of 110/78 mmHg.  After informed consent was documented, then utilizing the standard Gavino   protocol, the patient exercised for total duration of 11 minutes and 39   seconds achieving a maximal heart rate of 164 beats per minute which   represented approximately 86% of the age-predicted maximal heart rate. The   maximum workload achieved was 13.40 METS. The maximal achieved blood   pressure towards peak exercise was 126/52 MmHg.  The test was stopped   secondary to generalized fatigue.  The patient was observed for a total of   7 minutes into the recovery phase.    At the end of recovery phase, the patient's heart rate was 96 beats per   minute with a blood pressure of 124/58 mmHg.    FINAL   Impression: 1.There was a normal BP response to exercise with increase from 110/78 to   126/52.  2.The stress ECG is normal with < 0.5 mm up slopping ST depression     3.There were no arrhythmias noted.    CONCLUSION:    Negative treadmill stress test for detection of ischemia by symptoms and   ECG criteria.    Normal functional capacity.  Normal hemodynamic response to exercise.                Impression/Report/Plan:  Problem List Items Addressed This Visit        Cardiac and Vasculature    Abnormal electrocardiogram (ECG) (EKG)    Congenital heart disease    Pulmonic valve stenosis    Pulmonic valve regurgitation       Health Encounters    Encounter to discuss test results - Primary       Reviewed recent treadmill test results with patient.  Reassurance given.  BP is controlled.  Instructed to follow up with PCP regarding anxiety issues.  Discussed lifestyle modifications with exercise, heart healthy eating.  Follow up with Dr. Correa in 6 months and can schedule surveillance echocardiogram at that time.    Inessa Borjas NP  Adult nurse practitioner  Cardiology/cardiac electrophysiology    Contact via [employer]    ambulatory

## 2023-01-30 NOTE — ED CDU PROVIDER SUBSEQUENT DAY NOTE - CPE EDP CARDIAC NORM
Subjective


Patient is admitted for lightheadedness and diaphoresis and patient is found to 

have second-degree type I AV block which was initially believed to be causing 

the symptoms would patient about was a valid by cardiology, cardiology 

discontinued beta blocker and they do not believe the second-degree AV block is 

contributing to his symptoms.  Patient blood pressure was apparently 

uncontrolled for which the multiple new medications were added including 

amlodipine, Norvasc.  Patient is already on Lasix losartan and multiple other 

and it was a medications.  Patient serum creatinine is bit worse today probably 

because of aggressive blood pressure management because of which I'll hold off 

on hydralazine and the Lasix continue with the rest of the edematous medications

will recheck the creatinine tomorrow.  Cardiology is according monitoring one mo

re night for any lightheadedness symptoms.  Patient apparently had a fall has 

swelling of the right knee is complaining of pain in the right knee along the x-

ray patient does have some swelling in the right knee.  Patient does have scars 

from his previous surgery in the right knee.





Constitutional: Denied any fatigue denied any fever.


Cardio vascular: denied any chest pain, palpitations


Gastrointestinal denied any nausea vomiting


Pulmonary: Denied any shortness of breath cough


Neurologic denied any new focal deficits





All inpatient medications were reviewed and appropriate changes in these med

ications as dictated in the interval history and assessment and plan.











PHYSICAL EXAMINATION: 





GENERAL: The patient is alert and oriented x3, not in any acute distress. Well 

developed, well nourished. 


HEENT: Pupils are round and equally reacting to light. EOMI. No scleral icterus.

No conjunctival pallor. Normocephalic, atraumatic. No pharyngeal erythema. No 

thyromegaly. 


CARDIOVASCULAR: S1 and S2 present. No murmurs, rubs, or gallops. 


PULMONARY: Chest is clear to auscultation, no wheezing or crackles. 


ABDOMEN: Soft, nontender, nondistended, normoactive bowel sounds. No palpable 

organomegaly. 


EXTREMITIES: No cyanosis, clubbing, or pedal edema.   there is some right knee 

swelling


NEUROLOGICAL: Gross neurological examination did not reveal any focal deficits. 


SKIN: No rashes. 





Assessment and plan


-Lightheadedness, dizziness etiology is not clear initially believed to be 

secondary to second-degree beta blocker was discontinued patient will be 

monitored one more night.  Cardiology is recommending not requiring a pacemaker


-Hypertension well controlled at this time


-Acute renal failure probably secondary to aggressive blood pressure control, 

discontinue above-mentioned medications will monitor the kidney function


-Hyperlipidemia


-Type 2 diabetes mellitus


-Right knee pain we'll obtain an x-ray.


-DVT prophylaxis: Subcutaneous heparin








Objective





- Vital Signs


Vital signs: 


                                   Vital Signs











Temp  98.5 F   01/30/23 12:00


 


Pulse  99   01/30/23 12:00


 


Resp  19   01/30/23 12:00


 


BP  132/99   01/30/23 12:00


 


Pulse Ox  98   01/30/23 12:00


 


FiO2  21   01/28/23 19:57








                                 Intake & Output











 01/29/23 01/30/23 01/30/23





 18:59 06:59 18:59


 


Intake Total   180


 


Output Total 1200 1250 1700


 


Balance -1200 -1250 -1520


 


Weight   145.15 kg


 


Intake:   


 


  Oral   180


 


Output:   


 


  Urine 1200 1250 1700


 


Other:   


 


  Voiding Method Indwelling Catheter Indwelling Catheter Indwelling Catheter














- Labs


CBC & Chem 7: 


                                 01/30/23 07:07





                                 01/30/23 07:07


Labs: 


                  Abnormal Lab Results - Last 24 Hours (Table)











  01/29/23 01/29/23 01/30/23 Range/Units





  16:52 20:18 06:08 


 


RBC     (4.30-5.90)  m/uL


 


Hgb     (13.0-17.5)  gm/dL


 


Hct     (39.0-53.0)  %


 


MCV     (80.0-100.0)  fL


 


Sodium     (137-145)  mmol/L


 


Carbon Dioxide     (22-30)  mmol/L


 


Creatinine     (0.66-1.25)  mg/dL


 


Glucose     (74-99)  mg/dL


 


POC Glucose (mg/dL)  216 H  180 H  147 H  ()  mg/dL


 


Calcium     (8.4-10.2)  mg/dL














  01/30/23 01/30/23 01/30/23 Range/Units





  07:07 07:07 08:52 


 


RBC  4.21 L    (4.30-5.90)  m/uL


 


Hgb  10.6 L    (13.0-17.5)  gm/dL


 


Hct  31.9 L    (39.0-53.0)  %


 


MCV  75.9 L    (80.0-100.0)  fL


 


Sodium   135 L   (137-145)  mmol/L


 


Carbon Dioxide   31 H   (22-30)  mmol/L


 


Creatinine   1.31 H   (0.66-1.25)  mg/dL


 


Glucose   144 H   (74-99)  mg/dL


 


POC Glucose (mg/dL)    201 H  ()  mg/dL


 


Calcium   8.1 L   (8.4-10.2)  mg/dL














  01/30/23 Range/Units





  11:36 


 


RBC   (4.30-5.90)  m/uL


 


Hgb   (13.0-17.5)  gm/dL


 


Hct   (39.0-53.0)  %


 


MCV   (80.0-100.0)  fL


 


Sodium   (137-145)  mmol/L


 


Carbon Dioxide   (22-30)  mmol/L


 


Creatinine   (0.66-1.25)  mg/dL


 


Glucose   (74-99)  mg/dL


 


POC Glucose (mg/dL)  161 H  ()  mg/dL


 


Calcium   (8.4-10.2)  mg/dL normal...

## 2023-02-01 NOTE — CONSULT NOTE ADULT - PROVIDER SPECIALTY LIST ADULT
Device transmission reviewed. Device demonstrated appropriate function.       Electronically Signed by: Constantin Evans M.D.    2/7/2023  11:56 AM    
Neurology

## 2023-02-09 ENCOUNTER — APPOINTMENT (OUTPATIENT)
Dept: FAMILY MEDICINE | Facility: CLINIC | Age: 78
End: 2023-02-09

## 2023-03-03 ENCOUNTER — APPOINTMENT (OUTPATIENT)
Dept: FAMILY MEDICINE | Facility: CLINIC | Age: 78
End: 2023-03-03
Payer: MEDICARE

## 2023-03-03 VITALS
OXYGEN SATURATION: 96 % | HEIGHT: 60 IN | TEMPERATURE: 97.5 F | HEART RATE: 74 BPM | BODY MASS INDEX: 24.35 KG/M2 | SYSTOLIC BLOOD PRESSURE: 120 MMHG | RESPIRATION RATE: 16 BRPM | WEIGHT: 124 LBS | DIASTOLIC BLOOD PRESSURE: 70 MMHG

## 2023-03-03 PROCEDURE — 99213 OFFICE O/P EST LOW 20 MIN: CPT

## 2023-03-03 NOTE — ASSESSMENT
[FreeTextEntry1] : Patient is doing well she remains on alendronate and atorvastatin she feels well no systemic symptoms we will have a CT scan in the next several weeks for comparison with prior tracing she plans to follow a nonoperative course unless this enlarges significantly no change in medical plan follow-up in 6 months or as needed

## 2023-03-03 NOTE — REVIEW OF SYSTEMS
[Fever] : no fever [Chills] : no chills [Fatigue] : no fatigue [Hot Flashes] : no hot flashes [Vision Problems] : no vision problems [Sore Throat] : no sore throat [Chest Pain] : no chest pain [Palpitations] : no palpitations [Orthopnea] : no orthopnea [Paroxysmal Nocturnal Dyspnea] : no paroxysmal nocturnal dyspnea [Shortness Of Breath] : no shortness of breath [Wheezing] : no wheezing [Cough] : no cough [Dyspnea on Exertion] : no dyspnea on exertion [Abdominal Pain] : no abdominal pain [Nausea] : no nausea [Constipation] : no constipation [Vomiting] : no vomiting [Heartburn] : no heartburn [Melena] : no melena [Dysuria] : no dysuria [Incontinence] : no incontinence [Hematuria] : no hematuria [Frequency] : no frequency [Joint Pain] : no joint pain [Back Pain] : no back pain [Headache] : no headache [Dizziness] : no dizziness [Fainting] : no fainting [Memory Loss] : no memory loss [Unsteady Walking] : no ataxia

## 2023-03-03 NOTE — PHYSICAL EXAM
[No Acute Distress] : no acute distress [Well Nourished] : well nourished [Well-Appearing] : well-appearing [Normal Voice/Communication] : normal voice/communication [Normal Sclera/Conjunctiva] : normal sclera/conjunctiva [PERRL] : pupils equal round and reactive to light [Normal Outer Ear/Nose] : the outer ears and nose were normal in appearance [Normal Oropharynx] : the oropharynx was normal [No JVD] : no jugular venous distention [No Lymphadenopathy] : no lymphadenopathy [Supple] : supple [Thyroid Normal, No Nodules] : the thyroid was normal and there were no nodules present [No Respiratory Distress] : no respiratory distress  [No Accessory Muscle Use] : no accessory muscle use [Clear to Auscultation] : lungs were clear to auscultation bilaterally [Normal Rate] : normal rate  [Regular Rhythm] : with a regular rhythm [No Murmur] : no murmur heard [No Edema] : there was no peripheral edema [Soft] : abdomen soft [Non Tender] : non-tender [Non-distended] : non-distended [No Masses] : no abdominal mass palpated [No HSM] : no HSM [Normal Bowel Sounds] : normal bowel sounds [Normal Supraclavicular Nodes] : no supraclavicular lymphadenopathy [Normal Posterior Cervical Nodes] : no posterior cervical lymphadenopathy [Normal Anterior Cervical Nodes] : no anterior cervical lymphadenopathy [No CVA Tenderness] : no CVA  tenderness [No Spinal Tenderness] : no spinal tenderness [Coordination Grossly Intact] : coordination grossly intact [No Focal Deficits] : no focal deficits [Normal Gait] : normal gait

## 2023-03-03 NOTE — HISTORY OF PRESENT ILLNESS
[FreeTextEntry1] : Pulmonary nodule [de-identified] : Patient is seen here in follow-up on her pulmonary nodule this has been followed for many years now earlier or rather late last year the decision was made to biopsy it the patient at that time decided she prefer not to do it and would like to have it followed up by CT. the last CT showed a groundglass appearing nodule that had not grown appreciably over the past year the patient feels well she has no systemic symptoms no loss of appetite no weight loss she also had an episode several years ago of transient monocular blindness follow-up on her carotid arteries showed no hemodynamically significant blockage and she has remained asymptomatic in that regard review of systems otherwise unremarkable.  Recent laboratory is also unremarkable.

## 2023-03-07 ENCOUNTER — APPOINTMENT (OUTPATIENT)
Dept: SURGERY | Facility: CLINIC | Age: 78
End: 2023-03-07

## 2023-03-31 ENCOUNTER — OUTPATIENT (OUTPATIENT)
Dept: OUTPATIENT SERVICES | Facility: HOSPITAL | Age: 78
LOS: 1 days | End: 2023-03-31
Payer: MEDICARE

## 2023-03-31 ENCOUNTER — APPOINTMENT (OUTPATIENT)
Dept: CT IMAGING | Facility: CLINIC | Age: 78
End: 2023-03-31
Payer: MEDICARE

## 2023-03-31 DIAGNOSIS — Z90.722 ACQUIRED ABSENCE OF OVARIES, BILATERAL: Chronic | ICD-10-CM

## 2023-03-31 DIAGNOSIS — Z98.890 OTHER SPECIFIED POSTPROCEDURAL STATES: Chronic | ICD-10-CM

## 2023-03-31 DIAGNOSIS — Z90.49 ACQUIRED ABSENCE OF OTHER SPECIFIED PARTS OF DIGESTIVE TRACT: Chronic | ICD-10-CM

## 2023-03-31 DIAGNOSIS — R91.8 OTHER NONSPECIFIC ABNORMAL FINDING OF LUNG FIELD: ICD-10-CM

## 2023-03-31 PROCEDURE — 71250 CT THORAX DX C-: CPT | Mod: 26,MH

## 2023-03-31 PROCEDURE — 71250 CT THORAX DX C-: CPT

## 2023-04-06 NOTE — REASON FOR VISIT
[Home] : at home, [unfilled] , at the time of the visit. [Medical Office: ([facility])___] : at the medical office located in  [Patient] : the patient [Follow-Up: _____] : a [unfilled] follow-up visit

## 2023-04-06 NOTE — ED ADULT NURSE NOTE - CHPI ED NUR SYMPTOMS POS
[Alert] : alert [Well Nourished] : well nourished [Obese] : obese [No Acute Distress] : no acute distress [Well Developed] : well developed [Normal Sclera/Conjunctiva] : normal sclera/conjunctiva [EOMI] : extra ocular movement intact [Normal Outer Ear/Nose] : the ears and nose were normal in appearance [Normal Hearing] : hearing was normal [Normal Oropharynx] : the oropharynx was normal [Supple] : the neck was supple [Thyroid Not Enlarged] : the thyroid was not enlarged [No Respiratory Distress] : no respiratory distress [No Accessory Muscle Use] : no accessory muscle use [Normal Rate and Effort] : normal respiratory rate and effort [Clear to Auscultation] : lungs were clear to auscultation bilaterally [Normal S1, S2] : normal S1 and S2 [Normal Rate] : heart rate was normal [Regular Rhythm] : with a regular rhythm [Carotids Normal] : carotid pulses were normal with no bruits [No Edema] : no peripheral edema [Pedal Pulses Normal] : the pedal pulses are present [Normal Bowel Sounds] : normal bowel sounds [Not Tender] : non-tender [Not Distended] : not distended [Soft] : abdomen soft [Normal Anterior Cervical Nodes] : no anterior cervical lymphadenopathy [No CVA Tenderness] : no ~M costovertebral angle tenderness [No Spinal Tenderness] : no spinal tenderness [Spine Straight] : spine straight [Kyphosis] : no kyphosis present [Scoliosis] : no scoliosis [No Stigmata of Cushings Syndrome] : no stigmata of Cushings Syndrome [Normal Gait] : normal gait [Normal Strength/Tone] : muscle strength and tone were normal [No Rash] : no rash [Acanthosis Nigricans] : no acanthosis nigricans [Cranial Nerves Intact] : cranial nerves 2-12 were intact [No Motor Deficits] : the motor exam was normal [Normal Reflexes] : deep tendon reflexes were 2+ and symmetric [No Tremors] : no tremors [Oriented x3] : oriented to person, place, and time [Normal Affect] : the affect was normal [Normal Mood] : the mood was normal [de-identified] : DM2, obesity [de-identified] : refuse foot exam [de-identified] : patient does not remember all her medication &  dosages EDEMA/JOINT SWELLING

## 2023-04-12 ENCOUNTER — APPOINTMENT (OUTPATIENT)
Dept: THORACIC SURGERY | Facility: CLINIC | Age: 78
End: 2023-04-12
Payer: MEDICARE

## 2023-04-12 DIAGNOSIS — R91.8 OTHER NONSPECIFIC ABNORMAL FINDING OF LUNG FIELD: ICD-10-CM

## 2023-04-12 PROCEDURE — 99213 OFFICE O/P EST LOW 20 MIN: CPT | Mod: 95

## 2023-04-13 PROBLEM — R91.8 MASS OF UPPER LOBE OF RIGHT LUNG: Status: ACTIVE | Noted: 2017-10-05

## 2023-04-14 ENCOUNTER — APPOINTMENT (OUTPATIENT)
Dept: OBGYN | Facility: CLINIC | Age: 78
End: 2023-04-14

## 2023-04-14 NOTE — CONSULT LETTER
[Dear  ___] : Dear  [unfilled], [Courtesy Letter:] : I had the pleasure of seeing your patient, [unfilled], in my office today. [Please see my note below.] : Please see my note below. [Sincerely,] : Sincerely, [FreeTextEntry2] : Dr. Manuel Sheikh (PCP/referring) [FreeTextEntry3] : Adraino Last MD, FACS \par Vice Chairperson, Thoracic Surgery, [facility] Department of Cardiovascular & Thoracic Surgery \par , [facility] [employer] [facility]

## 2023-04-14 NOTE — HISTORY OF PRESENT ILLNESS
[FreeTextEntry1] : Ms. ALCIRA MENDOZA, 78 year old female, former smoker who presented for initial evaluation in March 2017 for lung nodule detected several years prior as part of a lung screening program. She has been monitored with serial CT scans since that time. \par \par Of note, she reports episode of transient right eye blindness in February 2020, evaluated at [facility] + outpatient by neurology (Dr. Jarquin), workup revealed questionable TIA.  \par \par CT Chest on 8/7/21:\par - 1.6 x 0.9 cm right middle lobe part solid nodule is minimally enlarged from the prior study and more enlarged from older studies.\par \par - 8/18/21:CT reviewed with pt, RML lung nodule has grown in size and suspicious for an indolent cancer. I recommended to surgically remove it but patient was not able to make a decision then.\par \par -  9/2021: booked for Right VATS RA RML Wedge, possible RMLobectomy for January 2022. Subsequently cancelled it due to wrist surgery she was having done.\par \par - 4/2022: Return to discuss new PET/CT. Findings ordered by her pulmonologist Dr. Berumen. Patient opting for surveillance versus surgery. \par \par Last seen in office on 09/21/2022: RML nodule has slowly grown since 2019.  Most recent CT appears more solid and more suspicious. I discussed the risks associated with surgery and risks of no intervention. Recommend Right VATS, robotic assisted RML wedge resection, possible lobectomy to be done at [facility]. However, she called in 12/2022, apprehensive about surgery and opted for repeat CT Chest in March. \par \par CT Chest on 03/31/2023:\par - Clustered micronodular opacities (likely representing mucus impacted airways) and bronchiolectasis primarily involving the anterior right upper lobe and biapical scarring appear similar compared to 9/13/2022.\par - Right middle lobe 1.6 x 0.8 cm mixed solid and ground-glass nodular opacity (coronal series image 45) is without significant change compared to 9/13/2022. This nodule remains indeterminate, but primary lung neoplasm is a diagnostic consideration.\par \par \par She is here today for follow up. Overall, she reports to be feeling well. Denies any chest pain, shortness of breath, cough, or hemoptysis. \par

## 2023-04-14 NOTE — ASSESSMENT
[FreeTextEntry1] : Ms. ALCIRA MENDOZA, 78 year old female, former smoker who presented for initial evaluation in March 2017 for lung nodule detected several years prior as part of a lung screening program. \par \par I have independently reviewed the medical records and imaging at the time of this office consultation. CT Chest reviewed with patient, revealing RML which has slowly grown since 2019. I suspect this is inflammatory vs. infection vs. malignancy. Once again, surgical approach reviewed with patient for definite diagnosis, right VATS, robotic assisted RML wedge resection. Discussed risks in details. She is apprehensive to do the surgery at this time. She will contact office with her decision.\par \par Recommendations reviewed with patient during this office visit, and all questions answered; Patient instructed on the importance of follow up and verbalizes understanding. \par \par \par I, CLAIRE Painter, personally performed the evaluation and management (E/M) services for this established patient. That E/M includes conducting the examination, assessing all new/exacerbated conditions, and establishing a new plan of care. Today, my ACP, Shin Hanks NP, was here to observe my evaluation and management services for this new problem/exacerbated condition to be followed going forward.\par \par

## 2023-06-23 ENCOUNTER — NON-APPOINTMENT (OUTPATIENT)
Age: 78
End: 2023-06-23

## 2023-06-23 ENCOUNTER — APPOINTMENT (OUTPATIENT)
Dept: FAMILY MEDICINE | Facility: CLINIC | Age: 78
End: 2023-06-23
Payer: MEDICARE

## 2023-06-23 VITALS
WEIGHT: 130 LBS | TEMPERATURE: 98.6 F | RESPIRATION RATE: 16 BRPM | DIASTOLIC BLOOD PRESSURE: 70 MMHG | HEART RATE: 83 BPM | SYSTOLIC BLOOD PRESSURE: 140 MMHG | HEIGHT: 60 IN | OXYGEN SATURATION: 96 % | BODY MASS INDEX: 25.52 KG/M2

## 2023-06-23 DIAGNOSIS — M47.22 OTHER SPONDYLOSIS WITH RADICULOPATHY, CERVICAL REGION: ICD-10-CM

## 2023-06-23 DIAGNOSIS — R91.1 SOLITARY PULMONARY NODULE: ICD-10-CM

## 2023-06-23 DIAGNOSIS — M80.00XS AGE-RELATED OSTEOPOROSIS WITH CURRENT PATHOLOGICAL FRACTURE, UNSPECIFIED SITE, SEQUELA: ICD-10-CM

## 2023-06-23 DIAGNOSIS — R42 DIZZINESS AND GIDDINESS: ICD-10-CM

## 2023-06-23 PROCEDURE — 99214 OFFICE O/P EST MOD 30 MIN: CPT | Mod: 25

## 2023-06-23 PROCEDURE — 93000 ELECTROCARDIOGRAM COMPLETE: CPT

## 2023-06-23 RX ORDER — ATORVASTATIN CALCIUM 20 MG/1
20 TABLET, FILM COATED ORAL
Qty: 90 | Refills: 3 | Status: COMPLETED | COMMUNITY
Start: 2020-03-03 | End: 2023-06-23

## 2023-06-23 RX ORDER — ALENDRONATE SODIUM 70 MG/1
70 TABLET ORAL
Qty: 4 | Refills: 3 | Status: COMPLETED | COMMUNITY
Start: 2021-01-04 | End: 2023-06-23

## 2023-06-23 NOTE — PHYSICAL EXAM
[No Acute Distress] : no acute distress [Well Nourished] : well nourished [Well Developed] : well developed [Well-Appearing] : well-appearing [Normal Sclera/Conjunctiva] : normal sclera/conjunctiva [PERRL] : pupils equal round and reactive to light [Normal Outer Ear/Nose] : the outer ears and nose were normal in appearance [Normal Oropharynx] : the oropharynx was normal [No JVD] : no jugular venous distention [No Lymphadenopathy] : no lymphadenopathy [No Accessory Muscle Use] : no accessory muscle use [Normal Percussion] : the chest was normal to percussion [Normal Rate] : normal rate  [No Murmur] : no murmur heard [No Edema] : there was no peripheral edema [Soft] : abdomen soft [Non Tender] : non-tender [Non-distended] : non-distended [No HSM] : no HSM [Normal Posterior Cervical Nodes] : no posterior cervical lymphadenopathy [Normal Anterior Cervical Nodes] : no anterior cervical lymphadenopathy [Speech Grossly Normal] : speech grossly normal [Memory Grossly Normal] : memory grossly normal [Normal Affect] : the affect was normal [de-identified] : Upon standing no real change in systolic blood pressure but patient does feel somewhat dizzy

## 2023-06-23 NOTE — REVIEW OF SYSTEMS
[Vision Problems] : vision problems [Headache] : headache [Dizziness] : dizziness [Anxiety] : anxiety [Fever] : no fever [Chills] : no chills [Fatigue] : no fatigue [Discharge] : no discharge [Earache] : no earache [Sore Throat] : no sore throat [Palpitations] : no palpitations [Chest Pain] : no chest pain [Leg Claudication] : no leg claudication [Lower Ext Edema] : no lower extremity edema [Orthopnea] : no orthopnea [Shortness Of Breath] : no shortness of breath [Wheezing] : no wheezing [Cough] : no cough [Dyspnea on Exertion] : no dyspnea on exertion [Abdominal Pain] : no abdominal pain [Nausea] : no nausea [Constipation] : no constipation [Diarrhea] : diarrhea [Vomiting] : no vomiting [Heartburn] : no heartburn [Melena] : no melena [Dysuria] : no dysuria [Incontinence] : no incontinence [Nocturia] : no nocturia [Hematuria] : no hematuria [Frequency] : no frequency [Joint Pain] : no joint pain [Fainting] : no fainting [Confusion] : no confusion

## 2023-06-23 NOTE — HISTORY OF PRESENT ILLNESS
[FreeTextEntry8] : Patient here complaining of lightheadedness which occurs frequently on a daily basis over the past several weeks to month this is associated sometimes with her moving her neck particularly if she tries to turn to the right there is no dizziness or vertigo type issue she has had a chronic discomfort the headache on the right side of her head from for many years she currently is being or continues to be evaluated for a suspicious lung lesion which she has to this point opted not to have removed last CT did not show any change in appearance over the past year she does have occasional flashes of light in her visual fields has been evaluated by ophthalmology they say her eyes are within normal limits she had a unremarkable carotid sonogram approximately a year ago.  Review of systems is otherwise unremarkable she is on no medications other than vitamins has not had laboratory work in about 9 months

## 2023-06-23 NOTE — PLAN
[FreeTextEntry1] : Etiology of patient's symptoms is not clear this may be related to her anxiety but it may be organic pathology we will start with laboratory work and will consider some imaging of the central nervous system perhaps a neurological consultation her EKG today is normal and there is no sign of arrhythmia and she does not complain of palpitations or cardiovascular like symptoms she does have this frequently when she moves her head whether or not there could be any vertebral artery issues might need to be clarified after routine lab was done we will discuss this further work-up should be scheduled for a visit in approximately a week to 10 days she has been told that the symptoms worsen or if she actually passes out or feels worse that she should go to the nearest emergency room

## 2023-07-06 ENCOUNTER — APPOINTMENT (OUTPATIENT)
Dept: FAMILY MEDICINE | Facility: CLINIC | Age: 78
End: 2023-07-06

## 2023-07-06 ENCOUNTER — LABORATORY RESULT (OUTPATIENT)
Age: 78
End: 2023-07-06

## 2023-07-06 LAB
25(OH)D3 SERPL-MCNC: 30 NG/ML
ALBUMIN SERPL ELPH-MCNC: 4.6 G/DL
ALP BLD-CCNC: 61 U/L
ALT SERPL-CCNC: 15 U/L
ANION GAP SERPL CALC-SCNC: 11 MMOL/L
APPEARANCE: CLEAR
AST SERPL-CCNC: 20 U/L
BILIRUB SERPL-MCNC: 0.6 MG/DL
BILIRUBIN URINE: NEGATIVE
BLOOD URINE: NEGATIVE
BUN SERPL-MCNC: 13 MG/DL
CALCIUM SERPL-MCNC: 9.5 MG/DL
CHLORIDE SERPL-SCNC: 103 MMOL/L
CHOLEST SERPL-MCNC: 201 MG/DL
CO2 SERPL-SCNC: 25 MMOL/L
COLOR: YELLOW
CREAT SERPL-MCNC: 0.76 MG/DL
EGFR: 80 ML/MIN/1.73M2
FOLATE SERPL-MCNC: >20 NG/ML
GLUCOSE QUALITATIVE U: NEGATIVE MG/DL
GLUCOSE SERPL-MCNC: 95 MG/DL
HDLC SERPL-MCNC: 53 MG/DL
KETONES URINE: NEGATIVE MG/DL
LDLC SERPL CALC-MCNC: 124 MG/DL
LEUKOCYTE ESTERASE URINE: ABNORMAL
NITRITE URINE: NEGATIVE
NONHDLC SERPL-MCNC: 148 MG/DL
PH URINE: 6.5
POTASSIUM SERPL-SCNC: 4.3 MMOL/L
PROT SERPL-MCNC: 7 G/DL
PROTEIN URINE: NORMAL MG/DL
SODIUM SERPL-SCNC: 139 MMOL/L
SPECIFIC GRAVITY URINE: 1.02
TRIGL SERPL-MCNC: 116 MG/DL
TSH SERPL-ACNC: 2.37 UIU/ML
UROBILINOGEN URINE: 0.2 MG/DL
VIT B12 SERPL-MCNC: 621 PG/ML

## 2023-07-07 LAB
ERYTHROCYTE [SEDIMENTATION RATE] IN BLOOD BY WESTERGREN METHOD: 9 MM/HR
ESTIMATED AVERAGE GLUCOSE: 114 MG/DL
HBA1C MFR BLD HPLC: 5.6 %

## 2023-07-18 ENCOUNTER — OUTPATIENT (OUTPATIENT)
Dept: OUTPATIENT SERVICES | Facility: HOSPITAL | Age: 78
LOS: 1 days | End: 2023-07-18
Payer: MEDICARE

## 2023-07-18 ENCOUNTER — APPOINTMENT (OUTPATIENT)
Dept: MRI IMAGING | Facility: CLINIC | Age: 78
End: 2023-07-18
Payer: MEDICARE

## 2023-07-18 DIAGNOSIS — Z90.722 ACQUIRED ABSENCE OF OVARIES, BILATERAL: Chronic | ICD-10-CM

## 2023-07-18 DIAGNOSIS — Z90.49 ACQUIRED ABSENCE OF OTHER SPECIFIED PARTS OF DIGESTIVE TRACT: Chronic | ICD-10-CM

## 2023-07-18 DIAGNOSIS — Z00.8 ENCOUNTER FOR OTHER GENERAL EXAMINATION: ICD-10-CM

## 2023-07-18 DIAGNOSIS — Z98.890 OTHER SPECIFIED POSTPROCEDURAL STATES: Chronic | ICD-10-CM

## 2023-07-18 PROCEDURE — 70551 MRI BRAIN STEM W/O DYE: CPT | Mod: MH

## 2023-07-18 PROCEDURE — 70551 MRI BRAIN STEM W/O DYE: CPT | Mod: 26,MH

## 2023-10-06 NOTE — PHYSICAL EXAM
[No Acute Distress] : no acute distress Spontaneous, unlabored and symmetrical [Well Nourished] : well nourished [Well Developed] : well developed [Well-Appearing] : well-appearing [Normal Sclera/Conjunctiva] : normal sclera/conjunctiva [PERRL] : pupils equal round and reactive to light [EOMI] : extraocular movements intact [Normal Outer Ear/Nose] : the outer ears and nose were normal in appearance [Normal Oropharynx] : the oropharynx was normal [Normal TMs] : both tympanic membranes were normal [No JVD] : no jugular venous distention [No Lymphadenopathy] : no lymphadenopathy [No Respiratory Distress] : no respiratory distress  [No Accessory Muscle Use] : no accessory muscle use [Clear to Auscultation] : lungs were clear to auscultation bilaterally [Normal Rate] : normal rate  [Regular Rhythm] : with a regular rhythm [No Murmur] : no murmur heard [Soft] : abdomen soft [Non Tender] : non-tender [Non-distended] : non-distended [Normal Supraclavicular Nodes] : no supraclavicular lymphadenopathy [Normal Posterior Cervical Nodes] : no posterior cervical lymphadenopathy [Normal Anterior Cervical Nodes] : no anterior cervical lymphadenopathy [No CVA Tenderness] : no CVA  tenderness [No Spinal Tenderness] : no spinal tenderness [No Rash] : no rash [No Skin Lesions] : no skin lesions [Coordination Grossly Intact] : coordination grossly intact

## 2023-10-10 ENCOUNTER — APPOINTMENT (OUTPATIENT)
Dept: MRI IMAGING | Facility: CLINIC | Age: 78
End: 2023-10-10
Payer: MEDICARE

## 2023-10-10 ENCOUNTER — APPOINTMENT (OUTPATIENT)
Dept: CT IMAGING | Facility: CLINIC | Age: 78
End: 2023-10-10
Payer: MEDICARE

## 2023-10-10 ENCOUNTER — OUTPATIENT (OUTPATIENT)
Dept: OUTPATIENT SERVICES | Facility: HOSPITAL | Age: 78
LOS: 1 days | End: 2023-10-10
Payer: MEDICARE

## 2023-10-10 DIAGNOSIS — Z98.890 OTHER SPECIFIED POSTPROCEDURAL STATES: Chronic | ICD-10-CM

## 2023-10-10 DIAGNOSIS — Z90.49 ACQUIRED ABSENCE OF OTHER SPECIFIED PARTS OF DIGESTIVE TRACT: Chronic | ICD-10-CM

## 2023-10-10 DIAGNOSIS — Z90.722 ACQUIRED ABSENCE OF OVARIES, BILATERAL: Chronic | ICD-10-CM

## 2023-10-10 DIAGNOSIS — M53.0 CERVICOCRANIAL SYNDROME: ICD-10-CM

## 2023-10-10 PROCEDURE — 72141 MRI NECK SPINE W/O DYE: CPT | Mod: 26,MH

## 2023-10-10 PROCEDURE — 71250 CT THORAX DX C-: CPT

## 2023-10-10 PROCEDURE — 71250 CT THORAX DX C-: CPT | Mod: 26,MH

## 2023-10-10 PROCEDURE — 72141 MRI NECK SPINE W/O DYE: CPT

## 2023-10-24 ENCOUNTER — NON-APPOINTMENT (OUTPATIENT)
Age: 78
End: 2023-10-24

## 2023-10-25 ENCOUNTER — APPOINTMENT (OUTPATIENT)
Dept: THORACIC SURGERY | Facility: CLINIC | Age: 78
End: 2023-10-25
Payer: MEDICARE

## 2023-10-25 DIAGNOSIS — R91.8 OTHER NONSPECIFIC ABNORMAL FINDING OF LUNG FIELD: ICD-10-CM

## 2023-10-25 PROCEDURE — 99442: CPT | Mod: 95

## 2024-01-29 ENCOUNTER — APPOINTMENT (OUTPATIENT)
Dept: ORTHOPEDIC SURGERY | Facility: CLINIC | Age: 79
End: 2024-01-29

## 2024-01-31 ENCOUNTER — APPOINTMENT (OUTPATIENT)
Dept: FAMILY MEDICINE | Facility: CLINIC | Age: 79
End: 2024-01-31
Payer: MEDICARE

## 2024-01-31 ENCOUNTER — RESULT CHARGE (OUTPATIENT)
Age: 79
End: 2024-01-31

## 2024-01-31 VITALS
RESPIRATION RATE: 14 BRPM | WEIGHT: 126 LBS | BODY MASS INDEX: 24.74 KG/M2 | HEIGHT: 60 IN | TEMPERATURE: 97.8 F | DIASTOLIC BLOOD PRESSURE: 78 MMHG | OXYGEN SATURATION: 95 % | HEART RATE: 84 BPM | SYSTOLIC BLOOD PRESSURE: 120 MMHG

## 2024-01-31 DIAGNOSIS — R30.0 DYSURIA: ICD-10-CM

## 2024-01-31 DIAGNOSIS — J01.90 ACUTE SINUSITIS, UNSPECIFIED: ICD-10-CM

## 2024-01-31 DIAGNOSIS — J20.8 ACUTE BRONCHITIS DUE TO OTHER SPECIFIED ORGANISMS: ICD-10-CM

## 2024-01-31 DIAGNOSIS — R06.02 SHORTNESS OF BREATH: ICD-10-CM

## 2024-01-31 DIAGNOSIS — Z11.59 ENCOUNTER FOR SCREENING FOR OTHER VIRAL DISEASES: ICD-10-CM

## 2024-01-31 LAB
BILIRUB UR QL STRIP: NEGATIVE
CLARITY UR: CLEAR
COLLECTION METHOD: NORMAL
GLUCOSE UR-MCNC: NEGATIVE
HCG UR QL: 0.2 EU/DL
HGB UR QL STRIP.AUTO: ABNORMAL
KETONES UR-MCNC: NEGATIVE
LEUKOCYTE ESTERASE UR QL STRIP: ABNORMAL
NITRITE UR QL STRIP: NEGATIVE
PH UR STRIP: 5.5
PROT UR STRIP-MCNC: NEGATIVE
SP GR UR STRIP: 1.01

## 2024-01-31 PROCEDURE — 99214 OFFICE O/P EST MOD 30 MIN: CPT

## 2024-01-31 RX ORDER — AZELASTINE HYDROCHLORIDE 137 UG/1
137 SPRAY, METERED NASAL
Qty: 1 | Refills: 3 | Status: ACTIVE | COMMUNITY
Start: 2024-01-31 | End: 1900-01-01

## 2024-01-31 RX ORDER — FLUTICASONE PROPIONATE 50 UG/1
50 SPRAY, METERED NASAL
Qty: 16 | Refills: 1 | Status: ACTIVE | COMMUNITY
Start: 2024-01-31 | End: 1900-01-01

## 2024-01-31 RX ORDER — FLUTICASONE PROPIONATE AND SALMETEROL XINAFOATE 115; 21 UG/1; UG/1
115-21 AEROSOL, METERED RESPIRATORY (INHALATION)
Qty: 1 | Refills: 0 | Status: ACTIVE | COMMUNITY
Start: 2024-01-31 | End: 1900-01-01

## 2024-01-31 RX ORDER — DOXYCYCLINE HYCLATE 100 MG/1
100 CAPSULE ORAL
Qty: 14 | Refills: 0 | Status: ACTIVE | COMMUNITY
Start: 2024-01-31 | End: 1900-01-01

## 2024-01-31 NOTE — ASSESSMENT
[FreeTextEntry1] : # Sinusitis/ headache/ ear fullness 1 week doxycycline, flonase, azelastine sent to pharmacy  # Viral bronchitis Trial of advair inhaler for breathing If pt still coughing in 1 month will send for CXR or other imaging Will try to avoid oral steroids due to hx of osteoporosis  # Dysuria POCT UA showed trace blood and leuks, possible she has irritation, but doxycycline should cover UTI pathogens as well  f/u PRN

## 2024-01-31 NOTE — PHYSICAL EXAM
[Normal] : no acute distress, well nourished, well developed and well-appearing [Normal Sclera/Conjunctiva] : normal sclera/conjunctiva [Normal Outer Ear/Nose] : the outer ears and nose were normal in appearance [Normal Oropharynx] : the oropharynx was normal [No Respiratory Distress] : no respiratory distress  [No Accessory Muscle Use] : no accessory muscle use [No CVA Tenderness] : no CVA  tenderness [de-identified] : L TM irritated and red, R TM w/ small area of scarring. Normal canals b/l. [de-identified] : L lung moderate exp wheezing, mild RLL exp wheezing

## 2024-01-31 NOTE — REVIEW OF SYSTEMS
[Earache] : earache [Hearing Loss] : hearing loss [Nasal Discharge] : no nasal discharge [Sore Throat] : no sore throat [Postnasal Drip] : no postnasal drip [Cough] : cough [Dyspnea on Exertion] : dyspnea on exertion [Dysuria] : dysuria [Hematuria] : hematuria [Negative] : Cardiovascular [FreeTextEntry6] : improving

## 2024-01-31 NOTE — HISTORY OF PRESENT ILLNESS
[FreeTextEntry8] : Pt comes in for sick visit. About 1 week ago developed cold symptoms w/ cough. About 5 days ago thought she was getting better, but 2-3 days ago started having headache, ear fullness, decreased hearing, dizziness. Cough is better - had a lot of phlegm, but it's less. Food tastes bad. She didn't do home COVID testing. Has been having SOB, worse from her baseline. Has been taking Aleve for headache. Has known stable R lung mass, follows w/ CT surgery routinely and gets imaging done routinely. Some burning w/ urination, had some blood when wiping.

## 2024-02-15 NOTE — CONSULT NOTE ADULT - ATTENDING COMMENTS
Patient Instructions Post Procedure      The anesthetics, sedatives or narcotics which were given to you today will be acting in your body for the next 24 hours, so you might feel a little sleepy or groggy.  This feeling should slowly wear off. Carefully read and follow the instructions.     You received sedation today:  - Do not drive or operate any machinery or power tools of any kind.   - No alcoholic beverages today, not even beer or wine.  - Do not make any important decisions or sign any legal documents.  - No over the counter medications that contain alcohol or that may cause drowsiness.    While it is common to experience mild to moderate abdominal distention, gas, or belching after your procedure, if any of these symptoms occur following discharge from the GI Lab or within one week of having your procedure, call the [facility] to be advised whether a visit to your nearest Urgent Care or Emergency Department is indicated.  Take this paper with you if you go.   - If you develop an allergic reaction to the medications that were given during your procedure such as difficulty breathing, rash, hives, severe nausea, vomiting or lightheadedness.  - If you experience chest pain, shortness of breath, severe abdominal pain, fevers and chills.  -If you develop signs and symptoms of bleeding such as blood in your spit, if your stools turn black, tarry, or bloody  - If you have not urinated within 8 hours following your procedure.  - If your IV site becomes painful, red, inflamed, or looks infected.    If you received a biopsy/polypectomy/sphincterotomy the following instructions apply below:  __ Do not use Aspirin containing products, non-steroidal medications or anti-coagulants for one week following your procedure. (Examples of these types of medications are: Advil, Arthrotec, Aleve, Coumadin, Ecotrin, Heparin, Ibuprofen, Indocin, Motrin, Naprosyn, Nuprin, Plavix, Vioxx, and Voltarin, or their generic  forms.  This list is not all-inclusive.  Check with your physician or pharmacist before resuming medications.)   __ Eat a soft diet today.  Avoid foods that are poorly digested for the next 24 hours.  These foods would include: nuts, beans, lettuce, red meats, and fried foods. Start with liquids and advance your diet as tolerated, gradually work up to eating solids.   __ Do not have a Barium Study or Enema for one week.    Your physician recommends the additional following instructions:    -You have a contact number available for emergencies. The signs and symptoms of potential delayed complications were discussed with you. You may return to normal activities tomorrow.  -Resume your previous diet or other if specified.  -Continue your present medications.   -We are waiting for your pathology results, if applicable.  -The findings and recommendations have been discussed with you and/or family.  - Please see Medication Reconciliation Form for new medication/medications prescribed.     If you experience any problems or have any questions following discharge from the GI Lab, please call: [phone] from 7 am- 4:30 pm.  In the event of an emergency please go to the closest Emergency Department or call  [phone]       The patient was seen and examined by me. Imaging (CT head, MRA head, MRA neck) reviewed by me. This is a 75F with hyperlipidemia who presented with several second of right eye transient mon ocular blindness. She reports having a very stressful morning with her  who has Alzheimer's and then saw a shade close over her right eye. Now marvel and no history of TMB, migraine, TIA or stroke. She has no active complaints. On exam, she is awake, alert, Ox3, fluent, follows commands, EOMI, VFF, face symmetric, no drift, 5/5 power x 4 extremities, normal sensation and coordination, normal gait. CT head shows maybe small old lacunar infarcts, though more likely microvascular disease. MRA shows no stenosis extracranially or intracranially. MRI brain is occurring at this moment. Impression: Transient monocular blindness. Plan to d/c home, outpatient follow-up. Aspirin 81 mg and statin. With no carotid stenosis, TMB is no dangerous. Follow with me and ophthalmology in the office. Call us if any questions.
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
## 2024-04-12 ENCOUNTER — APPOINTMENT (OUTPATIENT)
Dept: FAMILY MEDICINE | Facility: CLINIC | Age: 79
End: 2024-04-12
Payer: MEDICARE

## 2024-04-12 VITALS
RESPIRATION RATE: 16 BRPM | OXYGEN SATURATION: 96 % | BODY MASS INDEX: 24.15 KG/M2 | SYSTOLIC BLOOD PRESSURE: 118 MMHG | HEIGHT: 60 IN | HEART RATE: 75 BPM | WEIGHT: 123 LBS | DIASTOLIC BLOOD PRESSURE: 64 MMHG | TEMPERATURE: 98 F

## 2024-04-12 PROCEDURE — 99214 OFFICE O/P EST MOD 30 MIN: CPT

## 2024-04-12 NOTE — ASSESSMENT
[High Risk Surgery - Intraperitoneal, Intrathoracic or Supringuinal Vascular Procedures] : High Risk Surgery - Intraperitoneal, Intrathoracic or Supringuinal Vascular Procedures - No (0) [Ischemic Heart Disease] : Ischemic Heart Disease - No (0) [Congestive Heart Failure] : Congestive Heart Failure - No (0) [Prior Cerebrovascular Accident or TIA] : Prior Cerebrovascular Accident or TIA - No (0) [Creatinine >= 2mg/dL (1 Point)] : Creatinine >= 2mg/dL - No (0) [Insulin-dependent Diabetic (1 Point)] : Insulin-dependent Diabetic - No (0) [Patient Optimized for Surgery] : Patient optimized for surgery [No Further Testing Recommended] : no further testing recommended [As per surgery] : as per surgery [FreeTextEntry4] : Low risk surgery low risk patient proceed with surgery

## 2024-04-12 NOTE — HISTORY OF PRESENT ILLNESS
[No Pertinent Cardiac History] : no history of aortic stenosis, atrial fibrillation, coronary artery disease, recent myocardial infarction, or implantable device/pacemaker [No Pertinent Pulmonary History] : no history of asthma, COPD, sleep apnea, or smoking [No Adverse Anesthesia Reaction] : no adverse anesthesia reaction in self or family member [Chronic Anticoagulation] : no chronic anticoagulation [Chronic Kidney Disease] : no chronic kidney disease [Diabetes] : no diabetes [(Patient denies any chest pain, claudication, dyspnea on exertion, orthopnea, palpitations or syncope)] : Patient denies any chest pain, claudication, dyspnea on exertion, orthopnea, palpitations or syncope [FreeTextEntry1] : R cataract [FreeTextEntry2] : 4/25/24 [FreeTextEntry3] : Sable [FreeTextEntry4] : Patient here for preoperative evaluation prior to cataract surgery on the right eye she had the left eye done several years ago she feels well is currently taking no daily medications has no history of significant heart or lung disease some small pulmonary nodules being followed but no pulmonary function issues review of systems is unremarkable EKG from last June is within normal limits [FreeTextEntry7] : Normal EKG June 2023

## 2024-04-12 NOTE — REVIEW OF SYSTEMS
[Negative] : Psychiatric [FreeTextEntry7] : Recent treatment for stomach malady now resolved under the care of gastroenterology

## 2024-08-26 NOTE — HISTORY OF PRESENT ILLNESS
[FreeTextEntry1] : Ms. ALCIRA MENDOZA, 79 year old female, former smoker who presented for initial evaluation in March 2017 for lung nodule detected several years prior as part of a lung screening program. She has been monitored with serial CT scans since that time.   Of note, she reports episode of transient right eye blindness in February 2020, evaluated at [facility] + outpatient by neurology (Dr. Jarquin), workup revealed questionable TIA.    CT Chest on 8/7/21: - 1.6 x 0.9 cm right middle lobe part solid nodule is minimally enlarged from the prior study and more enlarged from older studies. - 8/18/21:CT reviewed with pt, RML lung nodule has grown in size and suspicious for an indolent cancer. I recommended to surgically remove it but patient was not able to make a decision then. -  9/2021: booked for Right VATS RA RML Wedge, possible RMLobectomy for January 2022. Subsequently cancelled it due to wrist surgery she was having done. - 4/2022: Return to discuss new PET/CT. Findings ordered by her pulmonologist Dr. Berumen. Patient opting for surveillance versus surgery.   Last seen in office on 09/21/2022: RML nodule has slowly grown since 2019.  Most recent CT appears more solid and more suspicious. I discussed the risks associated with surgery and risks of no intervention. Recommend Right VATS, robotic assisted RML wedge resection, possible lobectomy to be done at [facility]. However, she called in 12/2022, apprehensive about surgery and opted for repeat CT Chest in March.   CT Chest on 03/31/2023: - Clustered micronodular opacities (likely representing mucus impacted airways) and bronchiolectasis primarily involving the anterior right upper lobe and biapical scarring appear similar compared to 9/13/2022. - Right middle lobe 1.6 x 0.8 cm mixed solid and ground-glass nodular opacity (coronal series image 45) is without significant change compared to 9/13/2022. This nodule remains indeterminate, but primary lung neoplasm is a diagnostic consideration.  CT Chest on 10/10/2023: - Right middle lobe groundglass nodular opacity with a solid component grossly unchanged.  - Medial right upper lobe bronchiectasis with tiny nodularity some which is in a tree-in-bud pattern is unchanged.  - Peripheral tiny nodularity in the lingula unchanged. - Biapical pleural parenchymal scarring. - Atherosclerotic changes of the aorta and coronary vasculature.  CT Chest on 09/19/2024: -   She is here today for 1 year follow up via tele visit.

## 2024-08-26 NOTE — ASSESSMENT
[FreeTextEntry1] : Ms. ALCIRA MENDOZA, 79 year old female, former smoker who presented for initial evaluation in March 2017 for lung nodule detected several years prior as part of a lung screening program.  She presents today for follow up via tele visit.  I have independently reviewed the medical records and imaging at the time of this office consultation.  Recommendations reviewed with patient during this office visit, and all questions answered; Patient instructed on the importance of follow up and verbalizes understanding.

## 2024-08-26 NOTE — CONSULT LETTER
[Dear  ___] : Dear  [unfilled], [Courtesy Letter:] : I had the pleasure of seeing your patient, [unfilled], in my office today. [Please see my note below.] : Please see my note below. [Sincerely,] : Sincerely, [FreeTextEntry2] : Dr. Manuel Sheikh (PCP/referring) [FreeTextEntry3] : Adriano Last MD, FACS \par  Vice Chairperson, Thoracic Surgery, [facility]  Department of Cardiovascular & Thoracic Surgery \par  , [facility] [employer] at [facility]

## 2024-09-19 ENCOUNTER — OUTPATIENT (OUTPATIENT)
Dept: OUTPATIENT SERVICES | Facility: HOSPITAL | Age: 79
LOS: 1 days | End: 2024-09-19
Payer: MEDICARE

## 2024-09-19 ENCOUNTER — APPOINTMENT (OUTPATIENT)
Dept: CT IMAGING | Facility: CLINIC | Age: 79
End: 2024-09-19
Payer: MEDICARE

## 2024-09-19 DIAGNOSIS — M81.0 AGE-RELATED OSTEOPOROSIS W/OUT CURRENT PATHOLOGICAL FRACTURE: ICD-10-CM

## 2024-09-19 DIAGNOSIS — R91.1 SOLITARY PULMONARY NODULE: ICD-10-CM

## 2024-09-19 DIAGNOSIS — Z98.890 OTHER SPECIFIED POSTPROCEDURAL STATES: Chronic | ICD-10-CM

## 2024-09-19 DIAGNOSIS — Z90.722 ACQUIRED ABSENCE OF OVARIES, BILATERAL: Chronic | ICD-10-CM

## 2024-09-19 DIAGNOSIS — R73.03 PREDIABETES.: ICD-10-CM

## 2024-09-19 DIAGNOSIS — Z90.49 ACQUIRED ABSENCE OF OTHER SPECIFIED PARTS OF DIGESTIVE TRACT: Chronic | ICD-10-CM

## 2024-09-19 PROCEDURE — 71250 CT THORAX DX C-: CPT

## 2024-09-19 PROCEDURE — 71250 CT THORAX DX C-: CPT | Mod: 26,MH

## 2024-10-02 ENCOUNTER — APPOINTMENT (OUTPATIENT)
Dept: THORACIC SURGERY | Facility: CLINIC | Age: 79
End: 2024-10-02
Payer: MEDICARE

## 2024-10-02 ENCOUNTER — NON-APPOINTMENT (OUTPATIENT)
Age: 79
End: 2024-10-02

## 2024-10-02 DIAGNOSIS — R91.1 SOLITARY PULMONARY NODULE: ICD-10-CM

## 2024-10-02 PROCEDURE — 99443: CPT | Mod: 93

## 2024-10-02 NOTE — ASSESSMENT
[FreeTextEntry1] : Ms. ALCIRA MENDOZA, 79 year old female, former smoker who presented for initial evaluation in March 2017 for lung nodule detected several years prior as part of a lung screening program.  She presents today for follow up via tele visit.  I have independently reviewed the medical records and imaging at the time of this office consultation. Imaging reviewed and compared to prior. Right middle lobe nodule with an enlarging solid component. Discussed necessity of tissue diagnosis, which will be needed to dictate further treatment. Therefore, recommend referral to interventional Pulmonology regarding Tevin bronch biopsy. (9/19/24 CT Series 3, Image 77). Instructed for patient to return to office 1-2 weeks following procedure to discuss results and further plan of care. She is agreeable.   Recommendations reviewed with patient during this office visit, and all questions answered; Patient instructed on the importance of follow up and verbalizes understanding.   I, CLAIRE Painter, personally performed the evaluation and management (E/M) services for this established patient. That E/M includes assessing all new/exacerbated conditions, and establishing a new plan of care. Today, My ACP, Tabby Berger, was here to observe my evaluation and management services for this patient to be followed going forward.

## 2024-10-02 NOTE — REASON FOR VISIT
[Home] : at home, [unfilled] , at the time of the visit. [Medical Office: ([facility])___] : at the medical office located in  [Verbal consent obtained from patient] : the patient, [unfilled]

## 2024-10-02 NOTE — CONSULT LETTER
[FreeTextEntry2] : Dr. Manuel Sheikh (PCP/referring) [FreeTextEntry3] : Adriano Last MD, FACS \par  Vice Chairperson, Thoracic Surgery, [facility]  Department of Cardiovascular & Thoracic Surgery \par  , [facility] [employer] at [facility]

## 2024-10-02 NOTE — REASON FOR VISIT
[Home] : at home, [unfilled] , at the time of the visit. [Medical Office: ([facility])___] : at the medical office located in  [Verbal consent obtained from patient] : the patient, [unfilled]

## 2024-10-02 NOTE — CONSULT LETTER
[FreeTextEntry2] : Dr. Manuel Sheikh (PCP/referring) [FreeTextEntry3] : Adriano Last MD, FACS \par  Vice Chairperson, Thoracic Surgery, [facility]  Department of Cardiovascular & Thoracic Surgery \par  , [facility] [employer] at [facility]

## 2024-10-02 NOTE — HISTORY OF PRESENT ILLNESS
[FreeTextEntry1] : Ms. ALCIRA MENDOZA, 79 year old female, former smoker who presented for initial evaluation in March 2017 for lung nodule detected several years prior as part of a lung screening program. She has been monitored with serial CT scans since that time.   Of note, she reports episode of transient right eye blindness in February 2020, evaluated at [facility] + outpatient by neurology (Dr. Jarquin), workup revealed questionable TIA.    CT Chest on 8/7/21: - 1.6 x 0.9 cm right middle lobe part solid nodule is minimally enlarged from the prior study and more enlarged from older studies. - 8/18/21:CT reviewed with pt, RML lung nodule has grown in size and suspicious for an indolent cancer. I recommended to surgically remove it but patient was not able to make a decision then. -  9/2021: booked for Right VATS RA RML Wedge, possible RMLobectomy for January 2022. Subsequently cancelled it due to wrist surgery she was having done. - 4/2022: Return to discuss new PET/CT. Findings ordered by her pulmonologist Dr. Berumen. Patient opting for surveillance versus surgery.   Last seen in office on 09/21/2022: RML nodule has slowly grown since 2019.  Most recent CT appears more solid and more suspicious. I discussed the risks associated with surgery and risks of no intervention. Recommend Right VATS, robotic assisted RML wedge resection, possible lobectomy to be done at [facility]. However, she called in 12/2022, apprehensive about surgery and opted for repeat CT Chest in March.   CT Chest on 03/31/2023: - Clustered micronodular opacities (likely representing mucus impacted airways) and bronchiolectasis primarily involving the anterior right upper lobe and biapical scarring appear similar compared to 9/13/2022. - Right middle lobe 1.6 x 0.8 cm mixed solid and ground-glass nodular opacity (coronal series image 45) is without significant change compared to 9/13/2022. This nodule remains indeterminate, but primary lung neoplasm is a diagnostic consideration.  CT Chest on 10/10/2023: - Right middle lobe groundglass nodular opacity with a solid component grossly unchanged.  - Medial right upper lobe bronchiectasis with tiny nodularity some which is in a tree-in-bud pattern is unchanged.  - Peripheral tiny nodularity in the lingula unchanged. - Biapical pleural parenchymal scarring. - Atherosclerotic changes of the aorta and coronary vasculature.  CT Chest on 09/19/2024: - Unchanged right middle lobe approximately 2 cm part solid nodule with approximate 1 cm solid component (4-254, 6-113), and increased size of solid component compared to 9/13/2022.  - Unchanged multilobar tree-in-bud and mild bronchiectasis in the anterior segment of the right upper lobe.  - Unchanged left upper lobe peripheral 0.4 cm solid nodule (3-22). - Unchanged small cystic structure in the left cardiophrenic space. - Coronary artery calcifications. - Unchanged T6 and T8 compression fractures, and T8 vertebroplasty.  - Unchanged Schmorl's node and associated loss of height of L1.  She is here today for 1 year follow up via tele visit. Today, patient denies worsening SOB, chest pain, hemoptysis, fever, chills, night sweats, lightheadedness or dizziness.

## 2024-10-02 NOTE — HISTORY OF PRESENT ILLNESS
[FreeTextEntry1] : Ms. ALCIRA MENDOZA, 79 year old female, former smoker who presented for initial evaluation in March 2017 for lung nodule detected several years prior as part of a lung screening program. She has been monitored with serial CT scans since that time.   Of note, she reports episode of transient right eye blindness in February 2020, evaluated at [facility] + outpatient by neurology (Dr. Jarquin), workup revealed questionable TIA.    CT Chest on 8/7/21: - 1.6 x 0.9 cm right middle lobe part solid nodule is minimally enlarged from the prior study and more enlarged from older studies. - 8/18/21:CT reviewed with pt, RML lung nodule has grown in size and suspicious for an indolent cancer. I recommended to surgically remove it but patient was not able to make a decision then. -  9/2021: booked for Right VATS RA RML Wedge, possible RMLobectomy for January 2022. Subsequently cancelled it due to wrist surgery she was having done. - 4/2022: Return to discuss new PET/CT. Findings ordered by her pulmonologist Dr. Berumen. Patient opting for surveillance versus surgery.   Last seen in office on 09/21/2022: RML nodule has slowly grown since 2019.  Most recent CT appears more solid and more suspicious. I discussed the risks associated with surgery and risks of no intervention. Recommend Right VATS, robotic assisted RML wedge resection, possible lobectomy to be done at [facility]. However, she called in 12/2022, apprehensive about surgery and opted for repeat CT Chest in March.   CT Chest on 03/31/2023: - Clustered micronodular opacities (likely representing mucus impacted airways) and bronchiolectasis primarily involving the anterior right upper lobe and biapical scarring appear similar compared to 9/13/2022. - Right middle lobe 1.6 x 0.8 cm mixed solid and ground-glass nodular opacity (coronal series image 45) is without significant change compared to 9/13/2022. This nodule remains indeterminate, but primary lung neoplasm is a diagnostic consideration.  CT Chest on 10/10/2023: - Right middle lobe groundglass nodular opacity with a solid component grossly unchanged.  - Medial right upper lobe bronchiectasis with tiny nodularity some which is in a tree-in-bud pattern is unchanged.  - Peripheral tiny nodularity in the lingula unchanged. - Biapical pleural parenchymal scarring. - Atherosclerotic changes of the aorta and coronary vasculature.  CT Chest on 09/19/2024: - Unchanged right middle lobe approximately 2 cm part solid nodule with approximate 1 cm solid component (4-254, 6-113), and increased size of solid component compared to 9/13/2022.  - Unchanged multilobar tree-in-bud and mild bronchiectasis in the anterior segment of the right upper lobe.  - Unchanged left upper lobe peripheral 0.4 cm solid nodule (3-22). - Unchanged small cystic structure in the left cardiophrenic space. - Coronary artery calcifications. - Unchanged T6 and T8 compression fractures, and T8 vertebroplasty.  - Unchanged Schmorl's node and associated loss of height of L1.  She is here today for 1 year follow up via tele visit. Today, patient denies worsening SOB, chest pain, hemoptysis, fever, chills, night sweats, lightheadedness or dizziness.

## 2024-10-08 ENCOUNTER — LABORATORY RESULT (OUTPATIENT)
Age: 79
End: 2024-10-08

## 2024-10-14 ENCOUNTER — APPOINTMENT (OUTPATIENT)
Dept: FAMILY MEDICINE | Facility: CLINIC | Age: 79
End: 2024-10-14

## 2024-10-14 ENCOUNTER — NON-APPOINTMENT (OUTPATIENT)
Age: 79
End: 2024-10-14

## 2024-10-14 VITALS
BODY MASS INDEX: 26.5 KG/M2 | RESPIRATION RATE: 18 BRPM | WEIGHT: 135 LBS | HEART RATE: 85 BPM | TEMPERATURE: 97.7 F | HEIGHT: 60 IN | OXYGEN SATURATION: 98 % | DIASTOLIC BLOOD PRESSURE: 80 MMHG | SYSTOLIC BLOOD PRESSURE: 148 MMHG

## 2024-10-14 DIAGNOSIS — L71.9 ROSACEA, UNSPECIFIED: ICD-10-CM

## 2024-10-14 DIAGNOSIS — F41.9 ANXIETY DISORDER, UNSPECIFIED: ICD-10-CM

## 2024-10-14 DIAGNOSIS — R94.31 ABNORMAL ELECTROCARDIOGRAM [ECG] [EKG]: ICD-10-CM

## 2024-10-14 PROCEDURE — 93000 ELECTROCARDIOGRAM COMPLETE: CPT

## 2024-10-14 PROCEDURE — G0439: CPT

## 2024-10-14 PROCEDURE — G0008: CPT

## 2024-10-14 PROCEDURE — 90662 IIV NO PRSV INCREASED AG IM: CPT

## 2024-10-14 RX ORDER — ESCITALOPRAM OXALATE 5 MG/1
5 TABLET, FILM COATED ORAL
Refills: 0 | Status: ACTIVE | COMMUNITY

## 2024-10-14 RX ORDER — METRONIDAZOLE 7.5 MG/G
0.75 CREAM TOPICAL DAILY
Qty: 1 | Refills: 3 | Status: ACTIVE | COMMUNITY
Start: 2024-10-14 | End: 1900-01-01

## 2024-10-14 RX ORDER — ESCITALOPRAM OXALATE 5 MG/1
5 TABLET ORAL
Qty: 30 | Refills: 3 | Status: ACTIVE | COMMUNITY
Start: 2024-10-14 | End: 1900-01-01

## 2024-11-20 ENCOUNTER — APPOINTMENT (OUTPATIENT)
Dept: CARDIOLOGY | Facility: CLINIC | Age: 79
End: 2024-11-20
Payer: MEDICARE

## 2024-11-20 ENCOUNTER — NON-APPOINTMENT (OUTPATIENT)
Age: 79
End: 2024-11-20

## 2024-11-20 VITALS
WEIGHT: 138 LBS | HEART RATE: 83 BPM | SYSTOLIC BLOOD PRESSURE: 170 MMHG | OXYGEN SATURATION: 98 % | DIASTOLIC BLOOD PRESSURE: 80 MMHG | BODY MASS INDEX: 27.09 KG/M2 | HEIGHT: 60 IN

## 2024-11-20 VITALS — DIASTOLIC BLOOD PRESSURE: 76 MMHG | SYSTOLIC BLOOD PRESSURE: 140 MMHG

## 2024-11-20 DIAGNOSIS — R03.0 ELEVATED BLOOD-PRESSURE READING, W/OUT DIAGNOSIS OF HYPERTENSION: ICD-10-CM

## 2024-11-20 DIAGNOSIS — E78.5 HYPERLIPIDEMIA, UNSPECIFIED: ICD-10-CM

## 2024-11-20 DIAGNOSIS — I44.0 ATRIOVENTRICULAR BLOCK, FIRST DEGREE: ICD-10-CM

## 2024-11-20 PROCEDURE — 93000 ELECTROCARDIOGRAM COMPLETE: CPT

## 2024-11-20 PROCEDURE — 99204 OFFICE O/P NEW MOD 45 MIN: CPT

## 2024-12-02 ENCOUNTER — APPOINTMENT (OUTPATIENT)
Dept: CARDIOLOGY | Facility: CLINIC | Age: 79
End: 2024-12-02
Payer: MEDICARE

## 2024-12-02 PROCEDURE — 93306 TTE W/DOPPLER COMPLETE: CPT

## 2025-01-23 ENCOUNTER — APPOINTMENT (OUTPATIENT)
Age: 80
End: 2025-01-23
Payer: MEDICARE

## 2025-01-23 VITALS — DIASTOLIC BLOOD PRESSURE: 70 MMHG | SYSTOLIC BLOOD PRESSURE: 150 MMHG

## 2025-01-23 VITALS
HEART RATE: 69 BPM | RESPIRATION RATE: 16 BRPM | WEIGHT: 138 LBS | BODY MASS INDEX: 27.09 KG/M2 | OXYGEN SATURATION: 98 % | HEIGHT: 60 IN | TEMPERATURE: 98.1 F

## 2025-01-23 DIAGNOSIS — R03.0 ELEVATED BLOOD-PRESSURE READING, W/OUT DIAGNOSIS OF HYPERTENSION: ICD-10-CM

## 2025-01-23 DIAGNOSIS — I44.0 ATRIOVENTRICULAR BLOCK, FIRST DEGREE: ICD-10-CM

## 2025-01-23 DIAGNOSIS — R91.1 SOLITARY PULMONARY NODULE: ICD-10-CM

## 2025-01-23 DIAGNOSIS — M81.0 AGE-RELATED OSTEOPOROSIS W/OUT CURRENT PATHOLOGICAL FRACTURE: ICD-10-CM

## 2025-01-23 DIAGNOSIS — J18.9 PNEUMONIA, UNSPECIFIED ORGANISM: ICD-10-CM

## 2025-01-23 DIAGNOSIS — E78.5 HYPERLIPIDEMIA, UNSPECIFIED: ICD-10-CM

## 2025-01-23 PROCEDURE — 99205 OFFICE O/P NEW HI 60 MIN: CPT

## 2025-01-23 RX ORDER — CEFPODOXIME PROXETIL 200 MG/1
200 TABLET, FILM COATED ORAL
Qty: 10 | Refills: 0 | Status: ACTIVE | COMMUNITY
Start: 2025-01-23 | End: 1900-01-01

## 2025-01-23 RX ORDER — AZITHROMYCIN 250 MG/1
250 TABLET, FILM COATED ORAL
Qty: 1 | Refills: 0 | Status: ACTIVE | COMMUNITY
Start: 2025-01-23 | End: 1900-01-01

## 2025-01-23 RX ORDER — BENZONATATE 100 MG/1
100 CAPSULE ORAL
Qty: 15 | Refills: 0 | Status: ACTIVE | COMMUNITY
Start: 2025-01-23 | End: 1900-01-01

## 2025-02-24 ENCOUNTER — APPOINTMENT (OUTPATIENT)
Age: 80
End: 2025-02-24
Payer: MEDICARE

## 2025-02-24 VITALS
HEART RATE: 86 BPM | TEMPERATURE: 97.8 F | HEIGHT: 60 IN | RESPIRATION RATE: 16 BRPM | OXYGEN SATURATION: 98 % | BODY MASS INDEX: 27.09 KG/M2 | DIASTOLIC BLOOD PRESSURE: 79 MMHG | WEIGHT: 138 LBS | SYSTOLIC BLOOD PRESSURE: 150 MMHG

## 2025-02-24 DIAGNOSIS — I10 ESSENTIAL (PRIMARY) HYPERTENSION: ICD-10-CM

## 2025-02-24 DIAGNOSIS — E78.5 HYPERLIPIDEMIA, UNSPECIFIED: ICD-10-CM

## 2025-02-24 PROCEDURE — 99213 OFFICE O/P EST LOW 20 MIN: CPT

## 2025-08-01 ENCOUNTER — APPOINTMENT (OUTPATIENT)
Dept: CT IMAGING | Facility: CLINIC | Age: 80
End: 2025-08-01
Payer: MEDICARE

## 2025-08-01 ENCOUNTER — OUTPATIENT (OUTPATIENT)
Dept: OUTPATIENT SERVICES | Facility: HOSPITAL | Age: 80
LOS: 1 days | End: 2025-08-01
Payer: MEDICARE

## 2025-08-01 DIAGNOSIS — Z90.49 ACQUIRED ABSENCE OF OTHER SPECIFIED PARTS OF DIGESTIVE TRACT: Chronic | ICD-10-CM

## 2025-08-01 DIAGNOSIS — Z98.890 OTHER SPECIFIED POSTPROCEDURAL STATES: Chronic | ICD-10-CM

## 2025-08-01 DIAGNOSIS — Z90.722 ACQUIRED ABSENCE OF OVARIES, BILATERAL: Chronic | ICD-10-CM

## 2025-08-01 DIAGNOSIS — R91.8 OTHER NONSPECIFIC ABNORMAL FINDING OF LUNG FIELD: ICD-10-CM

## 2025-08-01 PROCEDURE — 71250 CT THORAX DX C-: CPT | Mod: 26

## 2025-08-01 PROCEDURE — 71250 CT THORAX DX C-: CPT

## 2025-08-27 ENCOUNTER — APPOINTMENT (OUTPATIENT)
Dept: THORACIC SURGERY | Facility: CLINIC | Age: 80
End: 2025-08-27
Payer: MEDICARE

## 2025-08-27 VITALS
SYSTOLIC BLOOD PRESSURE: 179 MMHG | OXYGEN SATURATION: 100 % | BODY MASS INDEX: 26.5 KG/M2 | WEIGHT: 135 LBS | DIASTOLIC BLOOD PRESSURE: 75 MMHG | HEIGHT: 60 IN | HEART RATE: 76 BPM

## 2025-08-27 DIAGNOSIS — R91.1 SOLITARY PULMONARY NODULE: ICD-10-CM

## 2025-08-27 PROCEDURE — 99215 OFFICE O/P EST HI 40 MIN: CPT

## 2025-09-09 ENCOUNTER — NON-APPOINTMENT (OUTPATIENT)
Age: 80
End: 2025-09-09

## 2025-09-11 ENCOUNTER — APPOINTMENT (OUTPATIENT)
Age: 80
End: 2025-09-11
Payer: MEDICARE

## 2025-09-11 VITALS
OXYGEN SATURATION: 93 % | BODY MASS INDEX: 26.5 KG/M2 | HEIGHT: 60 IN | DIASTOLIC BLOOD PRESSURE: 79 MMHG | HEART RATE: 90 BPM | WEIGHT: 135 LBS | SYSTOLIC BLOOD PRESSURE: 149 MMHG | TEMPERATURE: 97.7 F

## 2025-09-11 DIAGNOSIS — I10 ESSENTIAL (PRIMARY) HYPERTENSION: ICD-10-CM

## 2025-09-11 DIAGNOSIS — M81.0 AGE-RELATED OSTEOPOROSIS W/OUT CURRENT PATHOLOGICAL FRACTURE: ICD-10-CM

## 2025-09-11 DIAGNOSIS — Z01.818 ENCOUNTER FOR OTHER PREPROCEDURAL EXAMINATION: ICD-10-CM

## 2025-09-11 PROCEDURE — G2211 COMPLEX E/M VISIT ADD ON: CPT

## 2025-09-11 PROCEDURE — 99214 OFFICE O/P EST MOD 30 MIN: CPT

## 2025-09-18 ENCOUNTER — APPOINTMENT (OUTPATIENT)
Dept: THORACIC SURGERY | Facility: HOSPITAL | Age: 80
End: 2025-09-18
Payer: MEDICARE

## 2025-09-18 ENCOUNTER — RESULT REVIEW (OUTPATIENT)
Age: 80
End: 2025-09-18

## 2025-09-18 ENCOUNTER — TRANSCRIPTION ENCOUNTER (OUTPATIENT)
Age: 80
End: 2025-09-18

## 2025-09-18 PROCEDURE — ZZZZZ: CPT
